# Patient Record
Sex: MALE | Race: WHITE | URBAN - METROPOLITAN AREA
[De-identification: names, ages, dates, MRNs, and addresses within clinical notes are randomized per-mention and may not be internally consistent; named-entity substitution may affect disease eponyms.]

---

## 2019-03-11 ENCOUNTER — OFFICE VISIT (OUTPATIENT)
Dept: INTERNAL MEDICINE CLINIC | Age: 59
End: 2019-03-11
Payer: COMMERCIAL

## 2019-03-11 VITALS
WEIGHT: 195 LBS | BODY MASS INDEX: 31.47 KG/M2 | OXYGEN SATURATION: 98 % | SYSTOLIC BLOOD PRESSURE: 138 MMHG | TEMPERATURE: 98 F | DIASTOLIC BLOOD PRESSURE: 90 MMHG | HEART RATE: 65 BPM

## 2019-03-11 DIAGNOSIS — R53.83 OTHER FATIGUE: ICD-10-CM

## 2019-03-11 DIAGNOSIS — I25.10 CORONARY ARTERY DISEASE INVOLVING NATIVE CORONARY ARTERY OF NATIVE HEART WITHOUT ANGINA PECTORIS: ICD-10-CM

## 2019-03-11 DIAGNOSIS — B34.9 VIRAL SYNDROME: Primary | ICD-10-CM

## 2019-03-11 PROCEDURE — 3017F COLORECTAL CA SCREEN DOC REV: CPT | Performed by: INTERNAL MEDICINE

## 2019-03-11 PROCEDURE — 1036F TOBACCO NON-USER: CPT | Performed by: INTERNAL MEDICINE

## 2019-03-11 PROCEDURE — G8427 DOCREV CUR MEDS BY ELIG CLIN: HCPCS | Performed by: INTERNAL MEDICINE

## 2019-03-11 PROCEDURE — 99214 OFFICE O/P EST MOD 30 MIN: CPT | Performed by: INTERNAL MEDICINE

## 2019-03-11 PROCEDURE — G8419 CALC BMI OUT NRM PARAM NOF/U: HCPCS | Performed by: INTERNAL MEDICINE

## 2019-03-11 PROCEDURE — G8598 ASA/ANTIPLAT THER USED: HCPCS | Performed by: INTERNAL MEDICINE

## 2019-03-11 PROCEDURE — G8484 FLU IMMUNIZE NO ADMIN: HCPCS | Performed by: INTERNAL MEDICINE

## 2019-03-11 RX ORDER — PANTOPRAZOLE SODIUM 20 MG/1
20 TABLET, DELAYED RELEASE ORAL DAILY
COMMUNITY

## 2019-03-11 RX ORDER — CLOPIDOGREL BISULFATE 75 MG/1
75 TABLET ORAL DAILY
COMMUNITY

## 2019-03-11 RX ORDER — AMLODIPINE BESYLATE 5 MG/1
5 TABLET ORAL DAILY
COMMUNITY

## 2019-03-11 RX ORDER — NITROGLYCERIN 0.4 MG/1
0.4 TABLET SUBLINGUAL EVERY 5 MIN PRN
COMMUNITY

## 2019-03-11 ASSESSMENT — ENCOUNTER SYMPTOMS
VOMITING: 1
APNEA: 0
STRIDOR: 0
CHOKING: 0
COUGH: 0
SINUS PRESSURE: 0
NAUSEA: 1
RESPIRATORY NEGATIVE: 1
COLOR CHANGE: 0
VISUAL CHANGE: 1
BACK PAIN: 0

## 2020-06-08 ENCOUNTER — VIRTUAL VISIT (OUTPATIENT)
Dept: FAMILY MEDICINE CLINIC | Age: 60
End: 2020-06-08

## 2020-06-08 PROCEDURE — 99213 OFFICE O/P EST LOW 20 MIN: CPT | Performed by: INTERNAL MEDICINE

## 2020-06-08 ASSESSMENT — PATIENT HEALTH QUESTIONNAIRE - PHQ9
1. LITTLE INTEREST OR PLEASURE IN DOING THINGS: 0
SUM OF ALL RESPONSES TO PHQ9 QUESTIONS 1 & 2: 0
2. FEELING DOWN, DEPRESSED OR HOPELESS: 0
SUM OF ALL RESPONSES TO PHQ QUESTIONS 1-9: 0
SUM OF ALL RESPONSES TO PHQ QUESTIONS 1-9: 0

## 2020-06-08 ASSESSMENT — ENCOUNTER SYMPTOMS
COLOR CHANGE: 0
SINUS PRESSURE: 0
BACK PAIN: 1
APNEA: 0
EYES NEGATIVE: 1
CHOKING: 0
STRIDOR: 0

## 2020-06-08 NOTE — PROGRESS NOTES
Smoker        Packs/day: 0.50        Years: 40.00        Pack years: 21        Quit date: 2019        Years since quittin.3      Smokeless tobacco: Never Used    Alcohol use: Yes      Alcohol/week: 0.0 standard drinks      Comment: shayna Roman is a 61 y.o. male evaluated via telephone on 2020. Consent:  He and/or health care decision maker is aware that that he may receive a bill for this telephone service, depending on his insurance coverage, and has provided verbal consent to proceed: Yes      Documentation:  I communicated with the patient and/or health care decision maker about leg pain. .   Details of this discussion including any medical advice provided: by me. I affirm this is a Patient Initiated Episode with a Patient who has not had a related appointment within my department in the past 7 days or scheduled within the next 24 hours. Patient identification was verified at the start of the visit: Yes    Total Time: minutes: 11-20 minutes    Note: not billable if this call serves to triage the patient into an appointment for the relevant concern      Helane Heal       Leg Pain    The incident occurred more than 1 week ago. The incident occurred at home. Injury mechanism: pulled lt leg. The pain is present in the left leg and left thigh. The pain is moderate. The pain has been constant since onset. Associated symptoms include an inability to bear weight. He reports no foreign bodies present. The symptoms are aggravated by weight bearing and movement. The treatment provided no relief. Review of Systems   Constitutional: Positive for fatigue. Negative for appetite change. HENT: Negative for ear pain, hearing loss and sinus pressure. Eyes: Negative. Respiratory: Negative for apnea, choking and stridor. Cardiovascular: Negative for chest pain and palpitations. Genitourinary: Negative for discharge, hematuria, penile pain and penile swelling.    Musculoskeletal:

## 2021-03-18 ENCOUNTER — IMMUNIZATION (OUTPATIENT)
Dept: PRIMARY CARE CLINIC | Age: 61
End: 2021-03-18
Payer: COMMERCIAL

## 2021-03-18 PROCEDURE — 91301 COVID-19, MODERNA VACCINE 100MCG/0.5ML DOSE: CPT

## 2021-03-18 PROCEDURE — 0011A COVID-19, MODERNA VACCINE 100MCG/0.5ML DOSE: CPT

## 2021-04-15 ENCOUNTER — IMMUNIZATION (OUTPATIENT)
Dept: PRIMARY CARE CLINIC | Age: 61
End: 2021-04-15
Payer: COMMERCIAL

## 2021-04-15 PROCEDURE — 0012A COVID-19, MODERNA VACCINE 100MCG/0.5ML DOSE: CPT | Performed by: FAMILY MEDICINE

## 2021-04-15 PROCEDURE — 91301 COVID-19, MODERNA VACCINE 100MCG/0.5ML DOSE: CPT | Performed by: FAMILY MEDICINE

## 2022-03-07 ENCOUNTER — NURSE TRIAGE (OUTPATIENT)
Dept: OTHER | Facility: CLINIC | Age: 62
End: 2022-03-07

## 2022-03-07 NOTE — TELEPHONE ENCOUNTER
Received call from [facility]; Patient with Red Flag Complaint requesting to establish care with [facility]. Subjective: Caller states \"I have had lower back pain for a long time. I notice when I sit longer than 15 minutes it locks up. I also have numbness/tingling of the R foot that would usually go away by now. The last couple weeks or month has been difficult for me to move without pain. \" Pt denies CP, SOB, dizziness, dysuria, or hematuria. PMH: multiple stents placed in heart (takes plavix)       Current Symptoms: back pain, R calf pain, R foot numbness    Onset: 2 weeks ago; intermittent    Associated Symptoms: reduced activity    Pain Severity: 5/10; sharp; constant    Temperature:  Denies    What has been tried: tylenol    LMP: NA Pregnant: NA    Recommended disposition: See in Office Today. Pt is un-established and recommended to be seen in [address] [place], [place] [address] for evaluation. Pt states \" I would rather just be seen in the office by the PCP. I have been dealing with this issue for several weeks. \"     Care advice provided, patient verbalizes understanding; denies any other questions or concerns; instructed to call back for any new or worsening symptoms. Writer provided a warm transfer to [facility] in [address] for appointment scheduling. Attention Provider: Thank you for allowing me to participate in the care of your patient. The patient was connected to triage in response to information provided to the [facility]. Please do not respond through this encounter as the response is not directed to a shared pool.         Reason for Disposition   Numbness in a leg or foot (i.e., loss of sensation)    Protocols used: BACK PAIN-ADULT-[place]

## 2022-03-16 ENCOUNTER — OFFICE VISIT (OUTPATIENT)
Dept: INTERNAL MEDICINE CLINIC | Age: 62
End: 2022-03-16
Payer: COMMERCIAL

## 2022-03-16 VITALS
DIASTOLIC BLOOD PRESSURE: 70 MMHG | WEIGHT: 209 LBS | HEIGHT: 66 IN | SYSTOLIC BLOOD PRESSURE: 134 MMHG | HEART RATE: 58 BPM | BODY MASS INDEX: 33.59 KG/M2

## 2022-03-16 DIAGNOSIS — M51.16 LUMBAR DISC DISEASE WITH RADICULOPATHY: ICD-10-CM

## 2022-03-16 DIAGNOSIS — Z87.891 PERSONAL HISTORY OF TOBACCO USE: ICD-10-CM

## 2022-03-16 DIAGNOSIS — Z12.5 PROSTATE CANCER SCREENING: ICD-10-CM

## 2022-03-16 DIAGNOSIS — I10 PRIMARY HYPERTENSION: ICD-10-CM

## 2022-03-16 DIAGNOSIS — Z00.00 ANNUAL PHYSICAL EXAM: Primary | ICD-10-CM

## 2022-03-16 PROBLEM — Z95.5 S/P CORONARY ARTERY STENT PLACEMENT: Status: ACTIVE | Noted: 2020-07-10

## 2022-03-16 PROBLEM — I25.118 CORONARY ARTERY DISEASE OF NATIVE ARTERY OF NATIVE HEART WITH STABLE ANGINA PECTORIS (HCC): Status: ACTIVE | Noted: 2021-05-25

## 2022-03-16 PROBLEM — T82.855A CORONARY STENT RESTENOSIS: Status: ACTIVE | Noted: 2020-12-22

## 2022-03-16 PROBLEM — I45.10 RIGHT BUNDLE BRANCH BLOCK: Status: ACTIVE | Noted: 2020-11-24

## 2022-03-16 PROCEDURE — G0296 VISIT TO DETERM LDCT ELIG: HCPCS | Performed by: INTERNAL MEDICINE

## 2022-03-16 PROCEDURE — G8484 FLU IMMUNIZE NO ADMIN: HCPCS | Performed by: INTERNAL MEDICINE

## 2022-03-16 PROCEDURE — G0402 INITIAL PREVENTIVE EXAM: HCPCS | Performed by: INTERNAL MEDICINE

## 2022-03-16 RX ORDER — GABAPENTIN 100 MG/1
100 CAPSULE ORAL 3 TIMES DAILY
COMMUNITY

## 2022-03-16 SDOH — ECONOMIC STABILITY: FOOD INSECURITY: WITHIN THE PAST 12 MONTHS, YOU WORRIED THAT YOUR FOOD WOULD RUN OUT BEFORE YOU GOT MONEY TO BUY MORE.: NEVER TRUE

## 2022-03-16 SDOH — ECONOMIC STABILITY: FOOD INSECURITY: WITHIN THE PAST 12 MONTHS, THE FOOD YOU BOUGHT JUST DIDN'T LAST AND YOU DIDN'T HAVE MONEY TO GET MORE.: NEVER TRUE

## 2022-03-16 ASSESSMENT — ENCOUNTER SYMPTOMS
NAUSEA: 0
ABDOMINAL PAIN: 0
VOMITING: 0
SHORTNESS OF BREATH: 0
CONSTIPATION: 0
COLOR CHANGE: 0
WHEEZING: 0
COUGH: 0
SORE THROAT: 0
BACK PAIN: 1
CHEST TIGHTNESS: 0

## 2022-03-16 ASSESSMENT — PATIENT HEALTH QUESTIONNAIRE - PHQ9
SUM OF ALL RESPONSES TO PHQ QUESTIONS 1-9: 0
SUM OF ALL RESPONSES TO PHQ QUESTIONS 1-9: 0
1. LITTLE INTEREST OR PLEASURE IN DOING THINGS: 0
2. FEELING DOWN, DEPRESSED OR HOPELESS: 0
SUM OF ALL RESPONSES TO PHQ QUESTIONS 1-9: 0
SUM OF ALL RESPONSES TO PHQ9 QUESTIONS 1 & 2: 0
SUM OF ALL RESPONSES TO PHQ QUESTIONS 1-9: 0

## 2022-03-16 ASSESSMENT — SOCIAL DETERMINANTS OF HEALTH (SDOH): HOW HARD IS IT FOR YOU TO PAY FOR THE VERY BASICS LIKE FOOD, HOUSING, MEDICAL CARE, AND HEATING?: NOT HARD AT ALL

## 2022-03-16 NOTE — ASSESSMENT & PLAN NOTE
Age-related health maintenance and immunization recommendations reviewed and discussed with patient  Labs ordered, healthy diet and regular exercise recommendations made to patient  He is up-to-date with colonoscopy  He does have BPH however symptoms manageable without medications used to be on Flomax, will check PSA this visit  Depression screen is negative  Follow-up with cardiology and spinal surgery as scheduled

## 2022-03-16 NOTE — ASSESSMENT & PLAN NOTE
So spinal surgery this morning, MRI ordered and given prescription to try gabapentin, advised will follow along with spinal surgery, recommended to maintain regular level of physical activity and attempt weight loss

## 2022-03-16 NOTE — PROGRESS NOTES
ASSESSMENT/PLAN:  1. Annual physical exam  Assessment & Plan:   Age-related health maintenance and immunization recommendations reviewed and discussed with patient  Labs ordered, healthy diet and regular exercise recommendations made to patient  He is up-to-date with colonoscopy  He does have BPH however symptoms manageable without medications used to be on Flomax, will check PSA this visit  Depression screen is negative  Follow-up with cardiology and spinal surgery as scheduled  Orders:  -     Hemoglobin A1C; Future  -     Lipid Panel; Future  -     CBC; Future  -     Comprehensive Metabolic Panel; Future  -     Hepatitis C Antibody; Future  -     TSH with Reflex to FT4; Future  -     PSA, Prostatic Specific Antigen; Future  2. Lumbar disc disease with radiculopathy  Assessment & Plan:   So spinal surgery this morning, MRI ordered and given prescription to try gabapentin, advised will follow along with spinal surgery, recommended to maintain regular level of physical activity and attempt weight loss  3. Primary hypertension  4. Prostate cancer screening  -     PSA, Prostatic Specific Antigen; Future  5. Personal history of tobacco use  -     DC VISIT TO DISCUSS LUNG CA SCREEN W LDCT  -     CT Lung Screen (Annual); Future      Return in about 6 months (around 9/16/2022) for AWV. SUBJECTIVE  HPI:   Patient here to establish new PCP office visit, previous PCP retired  He is a 80-year-old male with known history of coronary artery disease s/p CABG, follows up with cardiology regularly, he also has hypertension, hyperlipidemia and BPH. Was seen in the emergency room recently for acute calf pain that was negative for DVT, he also had low back pain with tingling and numbness radiating down the right leg.   Does have past history of lumbar disc disease and was referred to spinal surgery home he saw this morning  He is currently retired, he is on SSI due to his heart disease, he is an ex-smoker,  and lives with his wife      Review of Systems   Constitutional: Negative for activity change, appetite change, fatigue and unexpected weight change. HENT: Negative for congestion, hearing loss, mouth sores and sore throat. Eyes: Negative for visual disturbance. Respiratory: Negative for cough, chest tightness, shortness of breath and wheezing. Cardiovascular: Negative for chest pain, palpitations and leg swelling. Gastrointestinal: Negative for abdominal pain, constipation, nausea and vomiting. Endocrine: Negative for cold intolerance and heat intolerance. Genitourinary: Positive for frequency. Negative for difficulty urinating, dysuria, hematuria, scrotal swelling and urgency. Musculoskeletal: Positive for arthralgias and back pain. Negative for gait problem and joint swelling. Skin: Negative for color change. Allergic/Immunologic: Negative for environmental allergies and immunocompromised state. Neurological: Positive for numbness. Negative for dizziness, light-headedness and headaches. Hematological: Does not bruise/bleed easily. Psychiatric/Behavioral: Negative for behavioral problems, dysphoric mood and sleep disturbance. The patient is not nervous/anxious. OBJECTIVE:    /70   Pulse 58   Ht 5' 6\" (1.676 m)   Wt 209 lb (94.8 kg)   BMI 33.73 kg/m²    Physical Exam  Constitutional:       Appearance: Normal appearance. He is normal weight. HENT:      Head: Normocephalic. Right Ear: Tympanic membrane and ear canal normal.      Left Ear: Tympanic membrane and ear canal normal.      Nose: Nose normal.      Mouth/Throat:      Mouth: Mucous membranes are moist.      Pharynx: Oropharynx is clear. Eyes:      Extraocular Movements: Extraocular movements intact. Conjunctiva/sclera: Conjunctivae normal.      Pupils: Pupils are equal, round, and reactive to light. Cardiovascular:      Rate and Rhythm: Normal rate and regular rhythm. Pulses: Normal pulses.       Heart sounds: Normal heart sounds. No murmur heard. Pulmonary:      Effort: Pulmonary effort is normal. No respiratory distress. Breath sounds: Normal breath sounds. No wheezing. Abdominal:      General: Bowel sounds are normal.      Palpations: Abdomen is soft. There is no mass. Tenderness: There is no abdominal tenderness. Musculoskeletal:         General: Tenderness present. No swelling, deformity or signs of injury. Normal range of motion. Cervical back: Normal range of motion and neck supple. Right lower leg: No edema. Left lower leg: No edema. Skin:     General: Skin is warm and dry. Neurological:      General: No focal deficit present. Mental Status: He is alert and oriented to person, place, and time. Mental status is at baseline. Cranial Nerves: No cranial nerve deficit. Psychiatric:         Mood and Affect: Mood normal.         Behavior: Behavior normal.         Thought Content: Thought content normal.           Electronically signed by Jairo Rich MD on 3/16/2022 at 12:10 PM.    This dictation was generated by voice recognition computer software. Although all attempts are made to edit the dictation for accuracy, there may be errors in the transcription that are not intended. Low Dose CT (LDCT) Lung Screening criteria met:     Age 50-77(Medicare) or 50-80 (USPSTF)   Pack year smoking >20   Still smoking or less than 15 year since quit   No sign or symptoms of lung cancer   > 11 months since last LDCT     Risks and benefits of lung cancer screening with LDCT scans discussed:    Significance of positive screen - False-positive LDCT results often occur. 95% of all positive results do not lead to a diagnosis of cancer. Usually further imaging can resolve most false-positive results; however, some patients may require invasive procedures.     Over diagnosis risk - 10% to 12% of screen-detected lung cancer cases are over diagnosedthat is, the cancer would not have been detected in the patient's lifetime without the screening. Need for follow up screens annually to continue lung cancer screening effectiveness     Risks associated with radiation from annual LDCT- Radiation exposure is about the same as for a mammogram, which is about 1/3 of the annual background radiation exposure from everyday life. Starting screening at age 54 is not likely to increase cancer risk from radiation exposure. Patients with comorbidities resulting in life expectancy of < 10 years, or that would preclude treatment of an abnormality identified on CT, should not be screened due to lack of benefit.     To obtain maximal benefit from this screening, smoking cessation and long-term abstinence from smoking is critical

## 2022-03-21 DIAGNOSIS — Z12.5 PROSTATE CANCER SCREENING: ICD-10-CM

## 2022-03-21 DIAGNOSIS — Z00.00 ANNUAL PHYSICAL EXAM: ICD-10-CM

## 2022-03-21 LAB
A/G RATIO: 1.7 (ref 1.1–2.2)
ALBUMIN SERPL-MCNC: 4.2 G/DL (ref 3.4–5)
ALP BLD-CCNC: 153 U/L (ref 40–129)
ALT SERPL-CCNC: 36 U/L (ref 10–40)
ANION GAP SERPL CALCULATED.3IONS-SCNC: 17 MMOL/L (ref 3–16)
AST SERPL-CCNC: 25 U/L (ref 15–37)
BILIRUB SERPL-MCNC: 0.3 MG/DL (ref 0–1)
BUN BLDV-MCNC: 11 MG/DL (ref 7–20)
CALCIUM SERPL-MCNC: 9.4 MG/DL (ref 8.3–10.6)
CHLORIDE BLD-SCNC: 107 MMOL/L (ref 99–110)
CHOLESTEROL, TOTAL: 117 MG/DL (ref 0–199)
CO2: 20 MMOL/L (ref 21–32)
CREAT SERPL-MCNC: 1 MG/DL (ref 0.8–1.3)
GFR AFRICAN AMERICAN: >60
GFR NON-AFRICAN AMERICAN: >60
GLUCOSE BLD-MCNC: 112 MG/DL (ref 70–99)
HCT VFR BLD CALC: 41.1 % (ref 40.5–52.5)
HDLC SERPL-MCNC: 22 MG/DL (ref 40–60)
HEMOGLOBIN: 13.4 G/DL (ref 13.5–17.5)
LDL CHOLESTEROL CALCULATED: 62 MG/DL
MCH RBC QN AUTO: 25.3 PG (ref 26–34)
MCHC RBC AUTO-ENTMCNC: 32.5 G/DL (ref 31–36)
MCV RBC AUTO: 78 FL (ref 80–100)
PDW BLD-RTO: 16.1 % (ref 12.4–15.4)
PLATELET # BLD: 209 K/UL (ref 135–450)
PMV BLD AUTO: 8 FL (ref 5–10.5)
POTASSIUM SERPL-SCNC: 4.5 MMOL/L (ref 3.5–5.1)
PROSTATE SPECIFIC ANTIGEN: 1.44 NG/ML (ref 0–4)
RBC # BLD: 5.27 M/UL (ref 4.2–5.9)
SODIUM BLD-SCNC: 144 MMOL/L (ref 136–145)
TOTAL PROTEIN: 6.7 G/DL (ref 6.4–8.2)
TRIGL SERPL-MCNC: 163 MG/DL (ref 0–150)
TSH REFLEX FT4: 1.52 UIU/ML (ref 0.27–4.2)
VLDLC SERPL CALC-MCNC: 33 MG/DL
WBC # BLD: 5 K/UL (ref 4–11)

## 2022-03-22 LAB
ESTIMATED AVERAGE GLUCOSE: 122.6 MG/DL
HBA1C MFR BLD: 5.9 %
HEPATITIS C ANTIBODY INTERPRETATION: REACTIVE

## 2022-04-15 PROBLEM — Z00.00 ANNUAL PHYSICAL EXAM: Status: RESOLVED | Noted: 2022-03-16 | Resolved: 2022-04-15

## 2022-06-22 ENCOUNTER — TELEPHONE (OUTPATIENT)
Dept: INTERNAL MEDICINE CLINIC | Age: 62
End: 2022-06-22

## 2022-06-22 DIAGNOSIS — U07.1 COVID-19: Primary | ICD-10-CM

## 2022-06-22 DIAGNOSIS — I25.118 CORONARY ARTERY DISEASE OF NATIVE ARTERY OF NATIVE HEART WITH STABLE ANGINA PECTORIS (HCC): ICD-10-CM

## 2022-06-22 NOTE — TELEPHONE ENCOUNTER
Pt calling has tested positive for covid---had gone to see [facility] Sunday at nursing home---he is running 102 fever has chills and cough---would like to see about getting the Paxlovid---please call pt. Thanks.

## 2022-06-22 NOTE — TELEPHONE ENCOUNTER
Paxlovid called in for patient. You will need to self-quarantine for 5 days AND be fever free x 24 hours w/out use of Tylenol/Advil/Aleve and 3 days after your symptom improvement, whichever happens later AND  followed by 5 days of masking . Anyone that you live with should also self quarantine for 5 days followed by 5 days of masking as well. This means that you should not go to the grocery store (use the Click [employer] at [employer] or have friends or family shop for you and leave on your porch) or go to the pharmacy (if you must, use the drive through and do not go into the store). I do recommend purchasing a pulse oximeter. This can be purchased through [employer]  or at your local pharmacy. This will help to monitor your oxygen levels. Should your oxygen levels drop below 92% you would need to go to the emergency department. If you should develop severe shortness of breath chest pain, please go to the ED. For your symptoms, I recommend managing them as you would a common cold with nasal saline, Coricidin HB , and Tylenol as needed for fevers and discomfort. Offer VV with Dr. Yazmin Najera tomorrow.

## 2024-03-04 ASSESSMENT — PATIENT HEALTH QUESTIONNAIRE - PHQ9
SUM OF ALL RESPONSES TO PHQ QUESTIONS 1-9: 2
2. FEELING DOWN, DEPRESSED OR HOPELESS: SEVERAL DAYS
SUM OF ALL RESPONSES TO PHQ QUESTIONS 1-9: 2
1. LITTLE INTEREST OR PLEASURE IN DOING THINGS: 1
SUM OF ALL RESPONSES TO PHQ QUESTIONS 1-9: 2
2. FEELING DOWN, DEPRESSED OR HOPELESS: 1
SUM OF ALL RESPONSES TO PHQ QUESTIONS 1-9: 2
1. LITTLE INTEREST OR PLEASURE IN DOING THINGS: SEVERAL DAYS
SUM OF ALL RESPONSES TO PHQ9 QUESTIONS 1 & 2: 2

## 2024-03-06 ENCOUNTER — OFFICE VISIT (OUTPATIENT)
Dept: INTERNAL MEDICINE CLINIC | Age: 64
End: 2024-03-06
Payer: MEDICARE

## 2024-03-06 VITALS
WEIGHT: 198 LBS | SYSTOLIC BLOOD PRESSURE: 140 MMHG | OXYGEN SATURATION: 97 % | DIASTOLIC BLOOD PRESSURE: 78 MMHG | BODY MASS INDEX: 31.96 KG/M2 | HEART RATE: 67 BPM

## 2024-03-06 DIAGNOSIS — I10 PRIMARY HYPERTENSION: ICD-10-CM

## 2024-03-06 DIAGNOSIS — N40.1 BENIGN PROSTATIC HYPERPLASIA WITH NOCTURIA: ICD-10-CM

## 2024-03-06 DIAGNOSIS — E78.2 MIXED HYPERLIPIDEMIA: ICD-10-CM

## 2024-03-06 DIAGNOSIS — R35.1 BENIGN PROSTATIC HYPERPLASIA WITH NOCTURIA: ICD-10-CM

## 2024-03-06 DIAGNOSIS — R73.01 IMPAIRED FASTING BLOOD SUGAR: ICD-10-CM

## 2024-03-06 DIAGNOSIS — R20.2 ARM PARESTHESIA, RIGHT: ICD-10-CM

## 2024-03-06 DIAGNOSIS — Z87.891 PERSONAL HISTORY OF TOBACCO USE: ICD-10-CM

## 2024-03-06 DIAGNOSIS — I25.118 CORONARY ARTERY DISEASE OF NATIVE ARTERY OF NATIVE HEART WITH STABLE ANGINA PECTORIS (HCC): ICD-10-CM

## 2024-03-06 DIAGNOSIS — R20.2 ARM PARESTHESIA, RIGHT: Primary | ICD-10-CM

## 2024-03-06 LAB
ALBUMIN SERPL-MCNC: 4.4 G/DL (ref 3.4–5)
ALBUMIN/GLOB SERPL: 1.6 {RATIO} (ref 1.1–2.2)
ALP SERPL-CCNC: 162 U/L (ref 40–129)
ALT SERPL-CCNC: 39 U/L (ref 10–40)
ANION GAP SERPL CALCULATED.3IONS-SCNC: 11 MMOL/L (ref 3–16)
AST SERPL-CCNC: 32 U/L (ref 15–37)
BACTERIA URNS QL MICRO: NORMAL /HPF
BILIRUB SERPL-MCNC: 0.4 MG/DL (ref 0–1)
BILIRUB UR QL STRIP.AUTO: NEGATIVE
BUN SERPL-MCNC: 12 MG/DL (ref 7–20)
CALCIUM SERPL-MCNC: 9.5 MG/DL (ref 8.3–10.6)
CHLORIDE SERPL-SCNC: 103 MMOL/L (ref 99–110)
CHOLEST SERPL-MCNC: 114 MG/DL (ref 0–199)
CLARITY UR: CLEAR
CO2 SERPL-SCNC: 26 MMOL/L (ref 21–32)
COLOR UR: ABNORMAL
CREAT SERPL-MCNC: 1.1 MG/DL (ref 0.8–1.3)
DEPRECATED RDW RBC AUTO: 17 % (ref 12.4–15.4)
EPI CELLS #/AREA URNS AUTO: 1 /HPF (ref 0–5)
GFR SERPLBLD CREATININE-BSD FMLA CKD-EPI: >60 ML/MIN/{1.73_M2}
GLUCOSE SERPL-MCNC: 109 MG/DL (ref 70–99)
GLUCOSE UR STRIP.AUTO-MCNC: NEGATIVE MG/DL
HCT VFR BLD AUTO: 42.8 % (ref 40.5–52.5)
HDLC SERPL-MCNC: 20 MG/DL (ref 40–60)
HGB BLD-MCNC: 13.9 G/DL (ref 13.5–17.5)
HGB UR QL STRIP.AUTO: NEGATIVE
HYALINE CASTS #/AREA URNS AUTO: 0 /LPF (ref 0–8)
KETONES UR STRIP.AUTO-MCNC: ABNORMAL MG/DL
LDLC SERPL CALC-MCNC: 73 MG/DL
LEUKOCYTE ESTERASE UR QL STRIP.AUTO: NEGATIVE
MCH RBC QN AUTO: 24.8 PG (ref 26–34)
MCHC RBC AUTO-ENTMCNC: 32.5 G/DL (ref 31–36)
MCV RBC AUTO: 76.4 FL (ref 80–100)
NITRITE UR QL STRIP.AUTO: NEGATIVE
PH UR STRIP.AUTO: 5.5 [PH] (ref 5–8)
PLATELET # BLD AUTO: 194 K/UL (ref 135–450)
PMV BLD AUTO: 8.8 FL (ref 5–10.5)
POTASSIUM SERPL-SCNC: 4.7 MMOL/L (ref 3.5–5.1)
PROT SERPL-MCNC: 7.2 G/DL (ref 6.4–8.2)
PROT UR STRIP.AUTO-MCNC: ABNORMAL MG/DL
PSA SERPL DL<=0.01 NG/ML-MCNC: 1.92 NG/ML (ref 0–4)
RBC # BLD AUTO: 5.6 M/UL (ref 4.2–5.9)
RBC CLUMPS #/AREA URNS AUTO: 2 /HPF (ref 0–4)
SODIUM SERPL-SCNC: 140 MMOL/L (ref 136–145)
SP GR UR STRIP.AUTO: 1.04 (ref 1–1.03)
TRIGL SERPL-MCNC: 105 MG/DL (ref 0–150)
TSH SERPL DL<=0.005 MIU/L-ACNC: 2.13 UIU/ML (ref 0.27–4.2)
UA COMPLETE W REFLEX CULTURE PNL UR: ABNORMAL
UA DIPSTICK W REFLEX MICRO PNL UR: YES
URN SPEC COLLECT METH UR: ABNORMAL
UROBILINOGEN UR STRIP-ACNC: 1 E.U./DL
VIT B12 SERPL-MCNC: 266 PG/ML (ref 211–911)
VLDLC SERPL CALC-MCNC: 21 MG/DL
WBC # BLD AUTO: 5.4 K/UL (ref 4–11)
WBC #/AREA URNS AUTO: 1 /HPF (ref 0–5)

## 2024-03-06 PROCEDURE — G8427 DOCREV CUR MEDS BY ELIG CLIN: HCPCS | Performed by: INTERNAL MEDICINE

## 2024-03-06 PROCEDURE — 3017F COLORECTAL CA SCREEN DOC REV: CPT | Performed by: INTERNAL MEDICINE

## 2024-03-06 PROCEDURE — 99214 OFFICE O/P EST MOD 30 MIN: CPT | Performed by: INTERNAL MEDICINE

## 2024-03-06 PROCEDURE — G8484 FLU IMMUNIZE NO ADMIN: HCPCS | Performed by: INTERNAL MEDICINE

## 2024-03-06 PROCEDURE — 3078F DIAST BP <80 MM HG: CPT | Performed by: INTERNAL MEDICINE

## 2024-03-06 PROCEDURE — G0296 VISIT TO DETERM LDCT ELIG: HCPCS | Performed by: INTERNAL MEDICINE

## 2024-03-06 PROCEDURE — 1036F TOBACCO NON-USER: CPT | Performed by: INTERNAL MEDICINE

## 2024-03-06 PROCEDURE — 3077F SYST BP >= 140 MM HG: CPT | Performed by: INTERNAL MEDICINE

## 2024-03-06 PROCEDURE — G8417 CALC BMI ABV UP PARAM F/U: HCPCS | Performed by: INTERNAL MEDICINE

## 2024-03-06 RX ORDER — ATORVASTATIN CALCIUM 80 MG/1
80 TABLET, FILM COATED ORAL NIGHTLY
COMMUNITY
Start: 2024-01-19

## 2024-03-06 RX ORDER — TAMSULOSIN HYDROCHLORIDE 0.4 MG/1
0.4 CAPSULE ORAL DAILY
Qty: 90 CAPSULE | Refills: 0 | Status: SHIPPED | OUTPATIENT
Start: 2024-03-06

## 2024-03-06 SDOH — ECONOMIC STABILITY: INCOME INSECURITY: HOW HARD IS IT FOR YOU TO PAY FOR THE VERY BASICS LIKE FOOD, HOUSING, MEDICAL CARE, AND HEATING?: NOT HARD AT ALL

## 2024-03-06 SDOH — ECONOMIC STABILITY: HOUSING INSECURITY
IN THE LAST 12 MONTHS, WAS THERE A TIME WHEN YOU DID NOT HAVE A STEADY PLACE TO SLEEP OR SLEPT IN A SHELTER (INCLUDING NOW)?: NO

## 2024-03-06 SDOH — ECONOMIC STABILITY: FOOD INSECURITY: WITHIN THE PAST 12 MONTHS, YOU WORRIED THAT YOUR FOOD WOULD RUN OUT BEFORE YOU GOT MONEY TO BUY MORE.: NEVER TRUE

## 2024-03-06 SDOH — ECONOMIC STABILITY: FOOD INSECURITY: WITHIN THE PAST 12 MONTHS, THE FOOD YOU BOUGHT JUST DIDN'T LAST AND YOU DIDN'T HAVE MONEY TO GET MORE.: NEVER TRUE

## 2024-03-06 ASSESSMENT — ENCOUNTER SYMPTOMS
COLOR CHANGE: 0
NAUSEA: 0
VOMITING: 0
BACK PAIN: 1
CONSTIPATION: 0
ABDOMINAL PAIN: 0
CHEST TIGHTNESS: 0
WHEEZING: 0
SHORTNESS OF BREATH: 0
SORE THROAT: 0
COUGH: 0

## 2024-03-06 NOTE — ASSESSMENT & PLAN NOTE
will update labs, reinforced recommendations to maintain healthy diet and active lifestyle, continue atorvastatin 80 mg, so far tolerating the dose well without any side effects

## 2024-03-06 NOTE — ASSESSMENT & PLAN NOTE
patient was on Flomax in the past with good relief of symptoms however he discontinued it due to to side effects which are unlikely secondary or related to Flomax, advised patient since this was years ago we will go ahead and do another trial and if same symptoms recur then we will discuss alternatives or referral to urology.  Will check PSA level as well

## 2024-03-06 NOTE — ASSESSMENT & PLAN NOTE
continues to be stable on current medication regimen, this has been recently modified by cardiology, he is currently on amlodipine twice daily, Plavix was discontinued and atorvastatin dose increased to 80 mg from 40.  Continue care as per cardiology recommendations at [facility]

## 2024-03-06 NOTE — PROGRESS NOTES
ASSESSMENT/PLAN:  1. Arm paresthesia, right  Assessment & Plan:    patient had recent CT scan of the head at the ER visit that was negative, he is denying any neck or shoulder pain, explained to patient symptoms can be secondary to peripheral neuropathy, advised will check EMG, check B12, folate and TSH levels and make recommendations pending results, he does have gabapentin at home which was given to him previously for low back pain and he is no longer using it, advised can try and restart to see if this will help with his arm paresthesia symptoms until results of EMG are back  Orders:  -     CBC; Future  -     Comprehensive Metabolic Panel; Future  -     Hemoglobin A1C; Future  -     TSH with Reflex to FT4; Future  -     Vitamin B12; Future  -     Alfonso Garsia MD (Inpatient and Outpatient EMG), [facility]  2. Benign prostatic hyperplasia with nocturia  Assessment & Plan:    patient was on Flomax in the past with good relief of symptoms however he discontinued it due to to side effects which are unlikely secondary or related to Flomax, advised patient since this was years ago we will go ahead and do another trial and if same symptoms recur then we will discuss alternatives or referral to urology.  Will check PSA level as well  Orders:  -     Urinalysis with Reflex to Culture; Future  -     tamsulosin (FLOMAX) 0.4 MG capsule; Take 1 capsule by mouth daily, Disp-90 capsule, R-0Normal  -     PSA, Prostatic Specific Antigen; Future  3. Coronary artery disease of native artery of native heart with stable angina pectoris (HCC)  Assessment & Plan:    continues to be stable on current medication regimen, this has been recently modified by cardiology, he is currently on amlodipine twice daily, Plavix was discontinued and atorvastatin dose increased to 80 mg from 40.  Continue care as per cardiology recommendations at [facility]  Orders:  -     CBC; Future  -     Comprehensive Metabolic Panel; Future  -

## 2024-03-06 NOTE — ASSESSMENT & PLAN NOTE
initial reading borderline elevated however recheck improved, reinforced recommendations for salt restriction, weight reduction, healthy diet and active lifestyle as other means to help control blood pressure, will update labs this visit, continue amlodipine and metoprolol

## 2024-03-06 NOTE — PATIENT INSTRUCTIONS
follow-up, he or she will help you understand what to do next.  After a lung cancer screening, you can go back to your usual activities right away.  A lung cancer screening test can't tell if you have lung cancer. If your results are positive, your doctor can't tell whether an abnormal finding is a harmless nodule, cancer, or something else without doing more tests.  What can you do to help prevent lung cancer?  Some lung cancers can't be prevented. But if you smoke, quitting smoking is the best step you can take to prevent lung cancer. If you want to quit, your doctor can recommend medicines or other ways to help.  Follow-up care is a key part of your treatment and safety. Be sure to make and go to all appointments, and call your doctor if you are having problems. It's also a good idea to know your test results and keep a list of the medicines you take.  Where can you learn more?  Go to https://www.[employer].net/patientEd and enter Q940 to learn more about \"Learning About Lung Cancer Screening.\"  Current as of: February 28, 2023               Content Version: 13.9  © 7896-2385 [employer].   Care instructions adapted under license by [employer]. If you have questions about a medical condition or this instruction, always ask your healthcare professional. [employer] disclaims any warranty or liability for your use of this information.

## 2024-03-06 NOTE — ASSESSMENT & PLAN NOTE
patient had recent CT scan of the head at the ER visit that was negative, he is denying any neck or shoulder pain, explained to patient symptoms can be secondary to peripheral neuropathy, advised will check EMG, check B12, folate and TSH levels and make recommendations pending results, he does have gabapentin at home which was given to him previously for low back pain and he is no longer using it, advised can try and restart to see if this will help with his arm paresthesia symptoms until results of EMG are back

## 2024-03-07 LAB
EST. AVERAGE GLUCOSE BLD GHB EST-MCNC: 116.9 MG/DL
HBA1C MFR BLD: 5.7 %

## 2024-05-27 DIAGNOSIS — R35.1 BENIGN PROSTATIC HYPERPLASIA WITH NOCTURIA: ICD-10-CM

## 2024-05-27 DIAGNOSIS — N40.1 BENIGN PROSTATIC HYPERPLASIA WITH NOCTURIA: ICD-10-CM

## 2024-05-28 DIAGNOSIS — R35.1 BENIGN PROSTATIC HYPERPLASIA WITH NOCTURIA: ICD-10-CM

## 2024-05-28 DIAGNOSIS — N40.1 BENIGN PROSTATIC HYPERPLASIA WITH NOCTURIA: ICD-10-CM

## 2024-05-28 RX ORDER — TAMSULOSIN HYDROCHLORIDE 0.4 MG/1
0.4 CAPSULE ORAL DAILY
Qty: 90 CAPSULE | OUTPATIENT
Start: 2024-05-28

## 2024-05-28 RX ORDER — TAMSULOSIN HYDROCHLORIDE 0.4 MG/1
0.4 CAPSULE ORAL DAILY
Qty: 30 CAPSULE | Refills: 0 | Status: SHIPPED | OUTPATIENT
Start: 2024-05-28

## 2024-05-28 NOTE — TELEPHONE ENCOUNTER
Please remind patient he is due for his 3 months follow-up and completing wellness visit.  I will approve 30 days but will need to make sure his follow-up visit is completed in the near future before able to order long-term refills

## 2024-05-28 NOTE — TELEPHONE ENCOUNTER
Last OV: 3/6/2024  Next OV: Visit date not found    Next appointment due: 06/06/2024    Last fill: 03/06/2024  Refills: 0

## 2024-06-26 DIAGNOSIS — R35.1 BENIGN PROSTATIC HYPERPLASIA WITH NOCTURIA: ICD-10-CM

## 2024-06-26 DIAGNOSIS — N40.1 BENIGN PROSTATIC HYPERPLASIA WITH NOCTURIA: ICD-10-CM

## 2024-06-26 RX ORDER — TAMSULOSIN HYDROCHLORIDE 0.4 MG/1
0.4 CAPSULE ORAL DAILY
Qty: 90 CAPSULE | Refills: 0 | Status: SHIPPED | OUTPATIENT
Start: 2024-06-26

## 2024-07-22 ENCOUNTER — TELEPHONE (OUTPATIENT)
Dept: INTERNAL MEDICINE CLINIC | Age: 64
End: 2024-07-22

## 2024-08-16 ENCOUNTER — TELEPHONE (OUTPATIENT)
Dept: INTERNAL MEDICINE CLINIC | Age: 64
End: 2024-08-16

## 2024-08-16 SDOH — HEALTH STABILITY: PHYSICAL HEALTH: ON AVERAGE, HOW MANY MINUTES DO YOU ENGAGE IN EXERCISE AT THIS LEVEL?: 60 MIN

## 2024-08-16 SDOH — HEALTH STABILITY: PHYSICAL HEALTH: ON AVERAGE, HOW MANY DAYS PER WEEK DO YOU ENGAGE IN MODERATE TO STRENUOUS EXERCISE (LIKE A BRISK WALK)?: 3 DAYS

## 2024-08-16 ASSESSMENT — PATIENT HEALTH QUESTIONNAIRE - PHQ9
SUM OF ALL RESPONSES TO PHQ9 QUESTIONS 1 & 2: 2
SUM OF ALL RESPONSES TO PHQ QUESTIONS 1-9: 2
1. LITTLE INTEREST OR PLEASURE IN DOING THINGS: SEVERAL DAYS
SUM OF ALL RESPONSES TO PHQ QUESTIONS 1-9: 2
2. FEELING DOWN, DEPRESSED OR HOPELESS: SEVERAL DAYS

## 2024-08-16 ASSESSMENT — LIFESTYLE VARIABLES
HOW MANY STANDARD DRINKS CONTAINING ALCOHOL DO YOU HAVE ON A TYPICAL DAY: 1
HOW OFTEN DO YOU HAVE SIX OR MORE DRINKS ON ONE OCCASION: 1
HOW MANY STANDARD DRINKS CONTAINING ALCOHOL DO YOU HAVE ON A TYPICAL DAY: 1 OR 2
HOW OFTEN DO YOU HAVE A DRINK CONTAINING ALCOHOL: 3
HOW OFTEN DO YOU HAVE A DRINK CONTAINING ALCOHOL: 2-4 TIMES A MONTH

## 2024-08-16 NOTE — TELEPHONE ENCOUNTER
Patient called from nurse triage line--states here lately he has been having issues with low blood pressure the past week.       Schedule patient for appointment on Monday

## 2024-08-19 ENCOUNTER — OFFICE VISIT (OUTPATIENT)
Dept: INTERNAL MEDICINE CLINIC | Age: 64
End: 2024-08-19
Payer: MEDICARE

## 2024-08-19 VITALS
SYSTOLIC BLOOD PRESSURE: 138 MMHG | BODY MASS INDEX: 30.18 KG/M2 | DIASTOLIC BLOOD PRESSURE: 80 MMHG | WEIGHT: 187 LBS | HEART RATE: 54 BPM | OXYGEN SATURATION: 99 %

## 2024-08-19 DIAGNOSIS — Z00.00 MEDICARE ANNUAL WELLNESS VISIT, SUBSEQUENT: Primary | ICD-10-CM

## 2024-08-19 DIAGNOSIS — M25.541 ARTHRALGIA OF BOTH HANDS: ICD-10-CM

## 2024-08-19 DIAGNOSIS — M51.16 LUMBAR DISC DISEASE WITH RADICULOPATHY: Primary | ICD-10-CM

## 2024-08-19 DIAGNOSIS — M25.542 ARTHRALGIA OF BOTH HANDS: ICD-10-CM

## 2024-08-19 DIAGNOSIS — M51.16 LUMBAR DISC DISEASE WITH RADICULOPATHY: ICD-10-CM

## 2024-08-19 DIAGNOSIS — N40.1 BENIGN PROSTATIC HYPERPLASIA WITH NOCTURIA: ICD-10-CM

## 2024-08-19 DIAGNOSIS — I25.118 CORONARY ARTERY DISEASE OF NATIVE ARTERY OF NATIVE HEART WITH STABLE ANGINA PECTORIS (HCC): ICD-10-CM

## 2024-08-19 DIAGNOSIS — R35.1 BENIGN PROSTATIC HYPERPLASIA WITH NOCTURIA: ICD-10-CM

## 2024-08-19 DIAGNOSIS — I10 PRIMARY HYPERTENSION: ICD-10-CM

## 2024-08-19 PROCEDURE — 1036F TOBACCO NON-USER: CPT | Performed by: INTERNAL MEDICINE

## 2024-08-19 PROCEDURE — 3079F DIAST BP 80-89 MM HG: CPT | Performed by: INTERNAL MEDICINE

## 2024-08-19 PROCEDURE — 99214 OFFICE O/P EST MOD 30 MIN: CPT | Performed by: INTERNAL MEDICINE

## 2024-08-19 PROCEDURE — 3075F SYST BP GE 130 - 139MM HG: CPT | Performed by: INTERNAL MEDICINE

## 2024-08-19 PROCEDURE — G8427 DOCREV CUR MEDS BY ELIG CLIN: HCPCS | Performed by: INTERNAL MEDICINE

## 2024-08-19 PROCEDURE — G0439 PPPS, SUBSEQ VISIT: HCPCS | Performed by: INTERNAL MEDICINE

## 2024-08-19 PROCEDURE — 3017F COLORECTAL CA SCREEN DOC REV: CPT | Performed by: INTERNAL MEDICINE

## 2024-08-19 PROCEDURE — G8417 CALC BMI ABV UP PARAM F/U: HCPCS | Performed by: INTERNAL MEDICINE

## 2024-08-19 ASSESSMENT — ENCOUNTER SYMPTOMS
WHEEZING: 0
VOMITING: 0
ABDOMINAL PAIN: 0
SHORTNESS OF BREATH: 0
COUGH: 0
COLOR CHANGE: 0
BACK PAIN: 1
CHEST TIGHTNESS: 0
SORE THROAT: 0
CONSTIPATION: 0
NAUSEA: 0

## 2024-08-19 NOTE — ASSESSMENT & PLAN NOTE
patient was previously seen by pain management however was discharged because he continued to use marijuana Gummies at the time, now reports he no longer uses that and it has not been helping his pain so he stopped, he wants to give it another shot with pain management, new referral placed

## 2024-08-19 NOTE — ASSESSMENT & PLAN NOTE
no evidence of acute inflammation on exam, suspect symptoms secondary to osteoarthritis, can continue daily Tylenol alternating with NSAIDs until able to see pain management

## 2024-08-19 NOTE — PROGRESS NOTES
provided to the patient in written form: see Patient Instructions/AVS.    Electronically signed by Oniel Foster MD on 8/19/2024 at 12:33 PM.

## 2024-08-19 NOTE — ASSESSMENT & PLAN NOTE
has been mostly stable, recent episodes with orthostatic hypotension appears to be side effects of medications however if that is not the case then he will move his follow-up appointment with cardiology which is not till the end of the year.

## 2024-08-19 NOTE — ASSESSMENT & PLAN NOTE
blood pressure initial reading borderline however recheck within acceptable range, continue current medication combination of amlodipine 5 mg once a day and metoprolol 50.  If readings starts fluctuating up again then we will go back to amlodipine 5 mg twice daily as suspect orthostatic hypotension is secondary to Flomax rather than his beta-blocker or calcium channel blocker therapy

## 2024-08-19 NOTE — ASSESSMENT & PLAN NOTE
Patient will attempt and discontinue Flomax to see if this will help with his postural hypotension specially that he continues to have nocturia of average 3 times per night despite taking Flomax.  If orthostatic dizziness subsides with discontinuing Flomax will probably need to discuss alternative therapy

## 2024-09-25 ENCOUNTER — HOSPITAL ENCOUNTER (OUTPATIENT)
Age: 64
Discharge: HOME | End: 2024-09-25
Payer: MEDICARE

## 2024-09-25 ENCOUNTER — OFFICE VISIT (OUTPATIENT)
Dept: PAIN MANAGEMENT | Age: 64
End: 2024-09-25
Payer: MEDICARE

## 2024-09-25 ENCOUNTER — HOSPITAL ENCOUNTER (OUTPATIENT)
Dept: GENERAL RADIOLOGY | Age: 64
Discharge: HOME | End: 2024-09-25
Payer: MEDICARE

## 2024-09-25 VITALS
OXYGEN SATURATION: 98 % | DIASTOLIC BLOOD PRESSURE: 77 MMHG | HEART RATE: 56 BPM | WEIGHT: 190 LBS | BODY MASS INDEX: 30.53 KG/M2 | HEIGHT: 66 IN | SYSTOLIC BLOOD PRESSURE: 154 MMHG

## 2024-09-25 DIAGNOSIS — M19.041 LOCALIZED OSTEOARTHRITIS OF HANDS, BILATERAL: ICD-10-CM

## 2024-09-25 DIAGNOSIS — M19.042 LOCALIZED OSTEOARTHRITIS OF HANDS, BILATERAL: ICD-10-CM

## 2024-09-25 DIAGNOSIS — G89.4 CHRONIC PAIN SYNDROME: ICD-10-CM

## 2024-09-25 DIAGNOSIS — M19.042 LOCALIZED OSTEOARTHRITIS OF HANDS, BILATERAL: Primary | ICD-10-CM

## 2024-09-25 DIAGNOSIS — M19.041 LOCALIZED OSTEOARTHRITIS OF HANDS, BILATERAL: Primary | ICD-10-CM

## 2024-09-25 DIAGNOSIS — Z51.81 ENCOUNTER FOR THERAPEUTIC DRUG MONITORING: ICD-10-CM

## 2024-09-25 LAB
CRP SERPL-MCNC: <3 MG/L (ref 0–5.1)
RHEUMATOID FACT SER IA-ACNC: <10 IU/ML

## 2024-09-25 PROCEDURE — 99204 OFFICE O/P NEW MOD 45 MIN: CPT | Performed by: NURSE PRACTITIONER

## 2024-09-25 PROCEDURE — 73130 X-RAY EXAM OF HAND: CPT

## 2024-09-25 PROCEDURE — 1036F TOBACCO NON-USER: CPT | Performed by: NURSE PRACTITIONER

## 2024-09-25 PROCEDURE — G8427 DOCREV CUR MEDS BY ELIG CLIN: HCPCS | Performed by: NURSE PRACTITIONER

## 2024-09-25 PROCEDURE — 3017F COLORECTAL CA SCREEN DOC REV: CPT | Performed by: NURSE PRACTITIONER

## 2024-09-25 PROCEDURE — G8417 CALC BMI ABV UP PARAM F/U: HCPCS | Performed by: NURSE PRACTITIONER

## 2024-09-25 PROCEDURE — 3077F SYST BP >= 140 MM HG: CPT | Performed by: NURSE PRACTITIONER

## 2024-09-25 PROCEDURE — 3078F DIAST BP <80 MM HG: CPT | Performed by: NURSE PRACTITIONER

## 2024-09-25 RX ORDER — HYDROCODONE BITARTRATE AND ACETAMINOPHEN 5; 325 MG/1; MG/1
1 TABLET ORAL EVERY 8 HOURS PRN
Qty: 63 TABLET | Refills: 0 | Status: SHIPPED | OUTPATIENT
Start: 2024-10-02 | End: 2024-10-23

## 2024-09-25 RX ORDER — HYDROCODONE BITARTRATE AND ACETAMINOPHEN 5; 325 MG/1; MG/1
1 TABLET ORAL EVERY 8 HOURS PRN
Qty: 21 TABLET | Refills: 0 | Status: SHIPPED | OUTPATIENT
Start: 2024-09-25 | End: 2024-10-02

## 2024-09-25 NOTE — PROGRESS NOTES
HISTORY OF PRESENT ILLNESS:  Mr. Baker is a 64 y.o. male presents for consultation at the request of Dr. Foster. His presenting problems are bilateral hand pain, low back pain. He has also been evaluated by PCP, cardiology. Onset of pain began about 2 years ago.   He rates the pain 10/10 and describes it as dull, aching. Pain is greater in his bilateral hands than his low back.  Pain is made worse by: getting up in the am, getting in/out chair. Activities that have been limited by pain that he otherwise tolerated well are sleeping.  Alternative therapies he has previously attempted are oral steroids, OTC medications. Current treatment regimen has helped relieve about 20% of the pain. Relieving factors of pain include nothing. Hedenies side effects from the current pain regimen. Patient reports mood is well and sleep patterns are Very poor. Patient deniesneurological bowel or bladder. Patient denies misusing/abusing his narcotic pain medications or using any illegal drugs. he admits to morning stiffness, fatigue, and occassional headaches.     Patient with history of 6 by cardiac bypass at age 42.  He reports by age 52 he had cardiac cath 10 times.  He does experience some right foot numbness that is chronic.  He is no longer on anticoagulants.  Worst pain for him is in bilateral hands and he reports it significantly affecting his sleep.  He reports nothing has helped his pain thus far.      ROS:  Review of Systems   Constitutional:  Positive for fatigue. Negative for unexpected weight change.   HENT:  Negative for congestion and dental problem.    Respiratory:  Negative for chest tightness and shortness of breath.    Cardiovascular:  Negative for chest pain, palpitations and leg swelling.   Gastrointestinal:  Negative for constipation.   Endocrine: Negative for polydipsia, polyphagia and polyuria.   Musculoskeletal:  Positive for arthralgias and back pain.        Bilateral hand pain   Skin:  Negative for rash.

## 2024-09-26 LAB — ANA SER QL IA: NEGATIVE

## 2024-10-08 PROBLEM — G89.4 CHRONIC PAIN SYNDROME: Status: ACTIVE | Noted: 2024-10-08

## 2024-10-08 PROBLEM — M19.041 LOCALIZED OSTEOARTHRITIS OF HANDS, BILATERAL: Status: ACTIVE | Noted: 2024-10-08

## 2024-10-08 PROBLEM — Z51.81 ENCOUNTER FOR THERAPEUTIC DRUG MONITORING: Status: ACTIVE | Noted: 2024-10-08

## 2024-10-08 PROBLEM — M19.042 LOCALIZED OSTEOARTHRITIS OF HANDS, BILATERAL: Status: ACTIVE | Noted: 2024-10-08

## 2024-10-08 ASSESSMENT — ENCOUNTER SYMPTOMS
CONSTIPATION: 0
SHORTNESS OF BREATH: 0
CHEST TIGHTNESS: 0
BACK PAIN: 1

## 2024-10-23 ENCOUNTER — OFFICE VISIT (OUTPATIENT)
Dept: PAIN MANAGEMENT | Age: 64
End: 2024-10-23
Payer: MEDICARE

## 2024-10-23 VITALS
HEART RATE: 57 BPM | DIASTOLIC BLOOD PRESSURE: 73 MMHG | OXYGEN SATURATION: 97 % | BODY MASS INDEX: 30.34 KG/M2 | SYSTOLIC BLOOD PRESSURE: 145 MMHG | WEIGHT: 188 LBS

## 2024-10-23 DIAGNOSIS — M19.041 LOCALIZED OSTEOARTHRITIS OF HANDS, BILATERAL: Primary | ICD-10-CM

## 2024-10-23 DIAGNOSIS — G89.4 CHRONIC PAIN SYNDROME: ICD-10-CM

## 2024-10-23 DIAGNOSIS — M19.042 LOCALIZED OSTEOARTHRITIS OF HANDS, BILATERAL: Primary | ICD-10-CM

## 2024-10-23 DIAGNOSIS — M79.2 NEUROPATHIC PAIN: ICD-10-CM

## 2024-10-23 DIAGNOSIS — Z51.81 ENCOUNTER FOR THERAPEUTIC DRUG MONITORING: ICD-10-CM

## 2024-10-23 PROCEDURE — 3017F COLORECTAL CA SCREEN DOC REV: CPT | Performed by: NURSE PRACTITIONER

## 2024-10-23 PROCEDURE — G8484 FLU IMMUNIZE NO ADMIN: HCPCS | Performed by: NURSE PRACTITIONER

## 2024-10-23 PROCEDURE — 99214 OFFICE O/P EST MOD 30 MIN: CPT | Performed by: NURSE PRACTITIONER

## 2024-10-23 PROCEDURE — 3077F SYST BP >= 140 MM HG: CPT | Performed by: NURSE PRACTITIONER

## 2024-10-23 PROCEDURE — 3078F DIAST BP <80 MM HG: CPT | Performed by: NURSE PRACTITIONER

## 2024-10-23 PROCEDURE — G8427 DOCREV CUR MEDS BY ELIG CLIN: HCPCS | Performed by: NURSE PRACTITIONER

## 2024-10-23 PROCEDURE — 1036F TOBACCO NON-USER: CPT | Performed by: NURSE PRACTITIONER

## 2024-10-23 PROCEDURE — G8417 CALC BMI ABV UP PARAM F/U: HCPCS | Performed by: NURSE PRACTITIONER

## 2024-10-23 RX ORDER — GABAPENTIN 300 MG/1
300 CAPSULE ORAL NIGHTLY PRN
Qty: 30 CAPSULE | Refills: 0 | Status: SHIPPED | OUTPATIENT
Start: 2024-10-23 | End: 2024-11-22

## 2024-10-23 RX ORDER — OXYCODONE AND ACETAMINOPHEN 5; 325 MG/1; MG/1
1 TABLET ORAL 2 TIMES DAILY PRN
Qty: 56 TABLET | Refills: 0 | Status: SHIPPED | OUTPATIENT
Start: 2024-10-23 | End: 2024-11-20

## 2024-10-23 NOTE — PROGRESS NOTES
Oh Baker  1960  [id number]      HISTORY OF PRESENT ILLNESS: Mr. Baker is a 64 y.o. male returns for a follow up visit for pain management  He has a diagnosis of   1. Localized osteoarthritis of hands, bilateral    2. Chronic pain syndrome    3. Encounter for therapeutic drug monitoring    .      New Medications since Last Office visit have been reviewed with patient.     As per Information Obtained from the PADT (Patient Assessment and Documentation Tool)    He complains of pain in the lower back, bilateral hands. He rates the pain 8/10 and describes it as aching, numbness. Current treatment regimen has helped relieve about 20% of the pain since beginning treatment plan.  He has constipation and tiredness stomach ache  any side effects from the current pain regimen. Patient reports that since implementation of their treatment plan; their physical functioning is unchanged, family/social relationships are unchanged, mood is unchanged sleep patterns are worse, and that the overall functioning is  somewhat better .  Patient denies/admits that any of the above have changed since last office visit. Patient denies misusing/abusing his narcotic pain medications or using any illegal drugs.      Upon obtaining medical history from Mr. Baker    ALLERGIES: Patients list of allergies were reviewed     MEDICATIONS: Mr. Baker list of medications were reviewed.His current medications are   Outpatient Medications Prior to Visit   Medication Sig Dispense Refill    HYDROcodone-acetaminophen (NORCO) 5-325 MG per tablet Take 1 tablet by mouth every 8 hours as needed for Pain for up to 21 days. Max Daily Amount: 3 tablets 63 tablet 0    atorvastatin (LIPITOR) 80 MG tablet Take 1 tablet by mouth nightly      amLODIPine (NORVASC) 5 MG tablet Take 2 tablets by mouth daily      nitroGLYCERIN (NITROSTAT) 0.4 MG SL tablet Place 1 tablet under the tongue every 5 minutes as needed for Chest pain up to max of 3 total doses.

## 2024-11-20 ENCOUNTER — OFFICE VISIT (OUTPATIENT)
Dept: PAIN MANAGEMENT | Age: 64
End: 2024-11-20
Payer: MEDICARE

## 2024-11-20 VITALS
DIASTOLIC BLOOD PRESSURE: 76 MMHG | WEIGHT: 192 LBS | SYSTOLIC BLOOD PRESSURE: 127 MMHG | OXYGEN SATURATION: 97 % | BODY MASS INDEX: 30.99 KG/M2 | HEART RATE: 54 BPM

## 2024-11-20 DIAGNOSIS — G89.4 CHRONIC PAIN SYNDROME: ICD-10-CM

## 2024-11-20 DIAGNOSIS — M19.042 LOCALIZED OSTEOARTHRITIS OF HANDS, BILATERAL: Primary | ICD-10-CM

## 2024-11-20 DIAGNOSIS — Z51.81 ENCOUNTER FOR THERAPEUTIC DRUG MONITORING: ICD-10-CM

## 2024-11-20 DIAGNOSIS — M19.041 LOCALIZED OSTEOARTHRITIS OF HANDS, BILATERAL: Primary | ICD-10-CM

## 2024-11-20 DIAGNOSIS — M79.2 NEUROPATHIC PAIN: ICD-10-CM

## 2024-11-20 PROCEDURE — 3078F DIAST BP <80 MM HG: CPT | Performed by: NURSE PRACTITIONER

## 2024-11-20 PROCEDURE — G8417 CALC BMI ABV UP PARAM F/U: HCPCS | Performed by: NURSE PRACTITIONER

## 2024-11-20 PROCEDURE — 3017F COLORECTAL CA SCREEN DOC REV: CPT | Performed by: NURSE PRACTITIONER

## 2024-11-20 PROCEDURE — G8427 DOCREV CUR MEDS BY ELIG CLIN: HCPCS | Performed by: NURSE PRACTITIONER

## 2024-11-20 PROCEDURE — G8484 FLU IMMUNIZE NO ADMIN: HCPCS | Performed by: NURSE PRACTITIONER

## 2024-11-20 PROCEDURE — 3074F SYST BP LT 130 MM HG: CPT | Performed by: NURSE PRACTITIONER

## 2024-11-20 PROCEDURE — 1036F TOBACCO NON-USER: CPT | Performed by: NURSE PRACTITIONER

## 2024-11-20 PROCEDURE — 99214 OFFICE O/P EST MOD 30 MIN: CPT | Performed by: NURSE PRACTITIONER

## 2024-11-20 RX ORDER — OXYCODONE AND ACETAMINOPHEN 5; 325 MG/1; MG/1
1 TABLET ORAL EVERY 8 HOURS PRN
Qty: 84 TABLET | Refills: 0 | Status: SHIPPED | OUTPATIENT
Start: 2024-11-20 | End: 2024-12-18

## 2024-11-20 RX ORDER — GABAPENTIN 300 MG/1
300 CAPSULE ORAL NIGHTLY PRN
Qty: 30 CAPSULE | Refills: 2 | Status: SHIPPED | OUTPATIENT
Start: 2024-11-20 | End: 2025-02-18

## 2024-11-20 NOTE — PROGRESS NOTES
Oh Baker  1960  [id number]      HISTORY OF PRESENT ILLNESS: Mr. Baker is a 64 y.o. male returns for a follow up visit for pain management  He has a diagnosis of   1. Localized osteoarthritis of hands, bilateral    2. Chronic pain syndrome    3. Encounter for therapeutic drug monitoring    4. Neuropathic pain    .      New Medications since Last Office visit have been reviewed with patient.     As per Information Obtained from the PADT (Patient Assessment and Documentation Tool)    He complains of pain in the neck, bilateral hands, lower back, right foot. He rates the pain 8/10 and describes it as aching, numbness. Current treatment regimen has helped relieve about 80% of the pain since beginning treatment plan.  He denies any side effects from the current pain regimen. Patient reports that since implementation of their treatment plan; their physical functioning is unchanged, family/social relationships are unchanged, mood is unchanged sleep patterns are unchanged, and that the overall functioning is unchanged.  Patient denies/admits that any of the above have changed since last office visit. Patient denies misusing/abusing his narcotic pain medications or using any illegal drugs.      Upon obtaining medical history from Mr. Baker    ALLERGIES: Patients list of allergies were reviewed     MEDICATIONS: Mr. Baker list of medications were reviewed.His current medications are   Outpatient Medications Prior to Visit   Medication Sig Dispense Refill    naloxone 4 MG/0.1ML LIQD nasal spray 1 spray by Nasal route as needed for Opioid Reversal 1 each 0    oxyCODONE-acetaminophen (PERCOCET) 5-325 MG per tablet Take 1 tablet by mouth 2 times daily as needed for Pain for up to 28 days. Max Daily Amount: 2 tablets 56 tablet 0    gabapentin (NEURONTIN) 300 MG capsule Take 1 capsule by mouth nightly as needed (neuropathic pain) for up to 30 days. 30 capsule 0    atorvastatin (LIPITOR) 80 MG tablet Take 1 tablet

## 2024-12-16 ENCOUNTER — TELEPHONE (OUTPATIENT)
Dept: PAIN MANAGEMENT | Age: 64
End: 2024-12-16

## 2024-12-16 DIAGNOSIS — M19.042 LOCALIZED OSTEOARTHRITIS OF HANDS, BILATERAL: ICD-10-CM

## 2024-12-16 DIAGNOSIS — M19.041 LOCALIZED OSTEOARTHRITIS OF HANDS, BILATERAL: ICD-10-CM

## 2024-12-16 RX ORDER — OXYCODONE AND ACETAMINOPHEN 5; 325 MG/1; MG/1
1 TABLET ORAL EVERY 8 HOURS PRN
Qty: 21 TABLET | Refills: 0 | Status: SHIPPED | OUTPATIENT
Start: 2024-12-16 | End: 2024-12-23

## 2024-12-23 ENCOUNTER — OFFICE VISIT (OUTPATIENT)
Dept: PAIN MANAGEMENT | Age: 64
End: 2024-12-23
Payer: MEDICARE

## 2024-12-23 VITALS
HEART RATE: 57 BPM | DIASTOLIC BLOOD PRESSURE: 71 MMHG | WEIGHT: 197 LBS | SYSTOLIC BLOOD PRESSURE: 134 MMHG | BODY MASS INDEX: 31.8 KG/M2 | OXYGEN SATURATION: 98 %

## 2024-12-23 DIAGNOSIS — M79.2 NEUROPATHIC PAIN: ICD-10-CM

## 2024-12-23 DIAGNOSIS — M19.041 LOCALIZED OSTEOARTHRITIS OF HANDS, BILATERAL: Primary | ICD-10-CM

## 2024-12-23 DIAGNOSIS — G89.4 CHRONIC PAIN SYNDROME: ICD-10-CM

## 2024-12-23 DIAGNOSIS — M19.042 LOCALIZED OSTEOARTHRITIS OF HANDS, BILATERAL: Primary | ICD-10-CM

## 2024-12-23 DIAGNOSIS — Z51.81 ENCOUNTER FOR THERAPEUTIC DRUG MONITORING: ICD-10-CM

## 2024-12-23 PROCEDURE — G8427 DOCREV CUR MEDS BY ELIG CLIN: HCPCS | Performed by: NURSE PRACTITIONER

## 2024-12-23 PROCEDURE — G8417 CALC BMI ABV UP PARAM F/U: HCPCS | Performed by: NURSE PRACTITIONER

## 2024-12-23 PROCEDURE — 99213 OFFICE O/P EST LOW 20 MIN: CPT | Performed by: NURSE PRACTITIONER

## 2024-12-23 PROCEDURE — 1036F TOBACCO NON-USER: CPT | Performed by: NURSE PRACTITIONER

## 2024-12-23 PROCEDURE — 3075F SYST BP GE 130 - 139MM HG: CPT | Performed by: NURSE PRACTITIONER

## 2024-12-23 PROCEDURE — 3078F DIAST BP <80 MM HG: CPT | Performed by: NURSE PRACTITIONER

## 2024-12-23 PROCEDURE — G8484 FLU IMMUNIZE NO ADMIN: HCPCS | Performed by: NURSE PRACTITIONER

## 2024-12-23 PROCEDURE — 3017F COLORECTAL CA SCREEN DOC REV: CPT | Performed by: NURSE PRACTITIONER

## 2024-12-23 RX ORDER — OXYCODONE AND ACETAMINOPHEN 5; 325 MG/1; MG/1
1 TABLET ORAL EVERY 8 HOURS PRN
Qty: 84 TABLET | Refills: 0 | Status: SHIPPED | OUTPATIENT
Start: 2024-12-23 | End: 2025-01-20

## 2024-12-23 NOTE — PROGRESS NOTES
inadvertently.  Thank you for allowing me to participate in the care of this patient.    MIGEL Marte    Cc: Oniel Mckeon MD

## 2025-01-20 ENCOUNTER — TELEPHONE (OUTPATIENT)
Dept: PAIN MANAGEMENT | Age: 65
End: 2025-01-20

## 2025-01-20 DIAGNOSIS — M19.042 LOCALIZED OSTEOARTHRITIS OF HANDS, BILATERAL: ICD-10-CM

## 2025-01-20 DIAGNOSIS — M19.041 LOCALIZED OSTEOARTHRITIS OF HANDS, BILATERAL: ICD-10-CM

## 2025-01-20 RX ORDER — OXYCODONE AND ACETAMINOPHEN 5; 325 MG/1; MG/1
1 TABLET ORAL EVERY 8 HOURS PRN
Qty: 21 TABLET | Refills: 0 | Status: SHIPPED | OUTPATIENT
Start: 2025-01-20 | End: 2025-01-27

## 2025-01-27 ENCOUNTER — OFFICE VISIT (OUTPATIENT)
Dept: PAIN MANAGEMENT | Age: 65
End: 2025-01-27
Payer: MEDICARE

## 2025-01-27 VITALS
HEART RATE: 58 BPM | SYSTOLIC BLOOD PRESSURE: 120 MMHG | OXYGEN SATURATION: 95 % | WEIGHT: 196 LBS | BODY MASS INDEX: 31.64 KG/M2 | DIASTOLIC BLOOD PRESSURE: 77 MMHG

## 2025-01-27 DIAGNOSIS — Z51.81 ENCOUNTER FOR THERAPEUTIC DRUG MONITORING: ICD-10-CM

## 2025-01-27 DIAGNOSIS — M79.2 NEUROPATHIC PAIN: ICD-10-CM

## 2025-01-27 DIAGNOSIS — M19.042 LOCALIZED OSTEOARTHRITIS OF HANDS, BILATERAL: Primary | ICD-10-CM

## 2025-01-27 DIAGNOSIS — G89.4 CHRONIC PAIN SYNDROME: ICD-10-CM

## 2025-01-27 DIAGNOSIS — M19.041 LOCALIZED OSTEOARTHRITIS OF HANDS, BILATERAL: Primary | ICD-10-CM

## 2025-01-27 PROCEDURE — G8417 CALC BMI ABV UP PARAM F/U: HCPCS | Performed by: NURSE PRACTITIONER

## 2025-01-27 PROCEDURE — 3017F COLORECTAL CA SCREEN DOC REV: CPT | Performed by: NURSE PRACTITIONER

## 2025-01-27 PROCEDURE — 3078F DIAST BP <80 MM HG: CPT | Performed by: NURSE PRACTITIONER

## 2025-01-27 PROCEDURE — 1036F TOBACCO NON-USER: CPT | Performed by: NURSE PRACTITIONER

## 2025-01-27 PROCEDURE — 99214 OFFICE O/P EST MOD 30 MIN: CPT | Performed by: NURSE PRACTITIONER

## 2025-01-27 PROCEDURE — G8427 DOCREV CUR MEDS BY ELIG CLIN: HCPCS | Performed by: NURSE PRACTITIONER

## 2025-01-27 PROCEDURE — 3074F SYST BP LT 130 MM HG: CPT | Performed by: NURSE PRACTITIONER

## 2025-01-27 RX ORDER — OXYCODONE AND ACETAMINOPHEN 5; 325 MG/1; MG/1
1 TABLET ORAL EVERY 8 HOURS PRN
Qty: 84 TABLET | Refills: 0 | Status: SHIPPED | OUTPATIENT
Start: 2025-01-27 | End: 2025-02-24

## 2025-01-27 RX ORDER — GABAPENTIN 300 MG/1
300 CAPSULE ORAL NIGHTLY PRN
Qty: 30 CAPSULE | Refills: 2 | Status: SHIPPED | OUTPATIENT
Start: 2025-01-27 | End: 2025-04-27

## 2025-02-18 ENCOUNTER — PATIENT MESSAGE (OUTPATIENT)
Dept: INTERNAL MEDICINE CLINIC | Age: 65
End: 2025-02-18

## 2025-02-24 ENCOUNTER — OFFICE VISIT (OUTPATIENT)
Dept: PAIN MANAGEMENT | Age: 65
End: 2025-02-24
Payer: MEDICARE

## 2025-02-24 VITALS
BODY MASS INDEX: 31.12 KG/M2 | WEIGHT: 192.8 LBS | DIASTOLIC BLOOD PRESSURE: 74 MMHG | HEART RATE: 54 BPM | OXYGEN SATURATION: 96 % | SYSTOLIC BLOOD PRESSURE: 114 MMHG

## 2025-02-24 DIAGNOSIS — Z51.81 ENCOUNTER FOR THERAPEUTIC DRUG MONITORING: ICD-10-CM

## 2025-02-24 DIAGNOSIS — M19.041 LOCALIZED OSTEOARTHRITIS OF HANDS, BILATERAL: Primary | ICD-10-CM

## 2025-02-24 DIAGNOSIS — M19.042 LOCALIZED OSTEOARTHRITIS OF HANDS, BILATERAL: Primary | ICD-10-CM

## 2025-02-24 DIAGNOSIS — M79.2 NEUROPATHIC PAIN: ICD-10-CM

## 2025-02-24 DIAGNOSIS — G89.4 CHRONIC PAIN SYNDROME: ICD-10-CM

## 2025-02-24 PROCEDURE — 3078F DIAST BP <80 MM HG: CPT | Performed by: NURSE PRACTITIONER

## 2025-02-24 PROCEDURE — G8427 DOCREV CUR MEDS BY ELIG CLIN: HCPCS | Performed by: NURSE PRACTITIONER

## 2025-02-24 PROCEDURE — G8417 CALC BMI ABV UP PARAM F/U: HCPCS | Performed by: NURSE PRACTITIONER

## 2025-02-24 PROCEDURE — 99214 OFFICE O/P EST MOD 30 MIN: CPT | Performed by: NURSE PRACTITIONER

## 2025-02-24 PROCEDURE — 3074F SYST BP LT 130 MM HG: CPT | Performed by: NURSE PRACTITIONER

## 2025-02-24 PROCEDURE — 3017F COLORECTAL CA SCREEN DOC REV: CPT | Performed by: NURSE PRACTITIONER

## 2025-02-24 PROCEDURE — 1036F TOBACCO NON-USER: CPT | Performed by: NURSE PRACTITIONER

## 2025-02-24 PROCEDURE — 1123F ACP DISCUSS/DSCN MKR DOCD: CPT | Performed by: NURSE PRACTITIONER

## 2025-02-24 RX ORDER — OXYCODONE AND ACETAMINOPHEN 5; 325 MG/1; MG/1
1 TABLET ORAL EVERY 8 HOURS PRN
Qty: 84 TABLET | Refills: 0 | Status: SHIPPED | OUTPATIENT
Start: 2025-02-25 | End: 2025-03-25

## 2025-02-24 NOTE — PROGRESS NOTES
tablet Take 1 tablet by mouth daily       No current facility-administered medications for this visit.     I will continue his current medication regimen  which is part of the above treatment schedule. It has been helping with Mr. Baker's chronic  medical problems which for this visit include:   The primary encounter diagnosis was Localized osteoarthritis of hands, bilateral. Diagnoses of Encounter for therapeutic drug monitoring, Chronic pain syndrome, and Neuropathic pain were also pertinent to this visit.   Risks and benefits of the medications and other alternative treatments  including no treatment were discussed with the patient.The common side effects of these medications were also explained to the patient.  Informed verbal consent was obtained.   Goals of current treatment regimen include improvement in pain, restoration of functioning- with focus on improvement in physical performance, general activity, work or disability,emotional distress, health care utilization and  decreased medication consumption. Will continue to monitor progress towards achieving/maintaining therapeutic goals with special emphasis on  1. Improvement in perceived interfernce  of pain with ADL's. Ability to do home exercises independently. Ability to do household chores indoor and/or outdoor work and social and leisure activities.Improve psychosocial and physical functioning. he is showing progression towards this treatment goal with the current regimen.     He was advised against drinking alcohol with the narcotic pain medicines, advised against driving or handling machinery while adjusting the dose of medicines or if having cognitive  issues related to the current medications.Risk of overdose and death, if medicines not taken as prescribed, were also discussed. If the patient develops new symptoms or if the symptoms worsen, the patient should call the office.    While transcribing every attempt was made to maintain the accuracy of

## 2025-03-18 ENCOUNTER — PATIENT MESSAGE (OUTPATIENT)
Dept: INTERNAL MEDICINE CLINIC | Age: 65
End: 2025-03-18

## 2025-03-18 DIAGNOSIS — Z12.12 SCREENING FOR COLORECTAL CANCER: Primary | ICD-10-CM

## 2025-03-18 DIAGNOSIS — Z12.11 SCREENING FOR COLORECTAL CANCER: Primary | ICD-10-CM

## 2025-03-24 ENCOUNTER — OFFICE VISIT (OUTPATIENT)
Dept: PAIN MANAGEMENT | Age: 65
End: 2025-03-24
Payer: MEDICARE

## 2025-03-24 VITALS
SYSTOLIC BLOOD PRESSURE: 140 MMHG | WEIGHT: 194 LBS | OXYGEN SATURATION: 97 % | DIASTOLIC BLOOD PRESSURE: 82 MMHG | BODY MASS INDEX: 31.31 KG/M2 | HEART RATE: 57 BPM

## 2025-03-24 DIAGNOSIS — M19.041 LOCALIZED OSTEOARTHRITIS OF HANDS, BILATERAL: ICD-10-CM

## 2025-03-24 DIAGNOSIS — M79.2 NEUROPATHIC PAIN: Primary | ICD-10-CM

## 2025-03-24 DIAGNOSIS — M19.042 LOCALIZED OSTEOARTHRITIS OF HANDS, BILATERAL: ICD-10-CM

## 2025-03-24 DIAGNOSIS — Z51.81 ENCOUNTER FOR THERAPEUTIC DRUG MONITORING: ICD-10-CM

## 2025-03-24 DIAGNOSIS — G89.4 CHRONIC PAIN SYNDROME: ICD-10-CM

## 2025-03-24 PROCEDURE — 3079F DIAST BP 80-89 MM HG: CPT | Performed by: NURSE PRACTITIONER

## 2025-03-24 PROCEDURE — G8417 CALC BMI ABV UP PARAM F/U: HCPCS | Performed by: NURSE PRACTITIONER

## 2025-03-24 PROCEDURE — G8427 DOCREV CUR MEDS BY ELIG CLIN: HCPCS | Performed by: NURSE PRACTITIONER

## 2025-03-24 PROCEDURE — 99214 OFFICE O/P EST MOD 30 MIN: CPT | Performed by: NURSE PRACTITIONER

## 2025-03-24 PROCEDURE — 1123F ACP DISCUSS/DSCN MKR DOCD: CPT | Performed by: NURSE PRACTITIONER

## 2025-03-24 PROCEDURE — 3077F SYST BP >= 140 MM HG: CPT | Performed by: NURSE PRACTITIONER

## 2025-03-24 PROCEDURE — 1036F TOBACCO NON-USER: CPT | Performed by: NURSE PRACTITIONER

## 2025-03-24 PROCEDURE — 3017F COLORECTAL CA SCREEN DOC REV: CPT | Performed by: NURSE PRACTITIONER

## 2025-03-24 RX ORDER — OXYCODONE AND ACETAMINOPHEN 5; 325 MG/1; MG/1
1 TABLET ORAL EVERY 8 HOURS PRN
Qty: 84 TABLET | Refills: 0 | Status: SHIPPED | OUTPATIENT
Start: 2025-03-24 | End: 2025-04-21

## 2025-03-24 NOTE — PROGRESS NOTES
Oh Baker  1960  [id number]      HISTORY OF PRESENT ILLNESS: Mr. aBker is a 65 y.o. male returns for a follow up visit for pain management  He has a diagnosis of   1. Neuropathic pain    2. Localized osteoarthritis of hands, bilateral    3. Encounter for therapeutic drug monitoring    4. Chronic pain syndrome    .      New Medications since Last Office visit have been reviewed with patient.     As per Information Obtained from the PADT (Patient Assessment and Documentation Tool)    He complains of pain in the neck, bilateral hands, right foot. He rates the pain 8/10 and describes it as aching. Current treatment regimen has helped relieve about 80% of the pain since beginning treatment plan.  He denies any side effects from the current pain regimen. Patient reports that since implementation of their treatment plan; their physical functioning is unchanged, family/social relationships are unchanged, mood is unchanged sleep patterns are unchanged, and that the overall functioning is better.  Patient denies/admits that any of the above have changed since last office visit. Patient denies misusing/abusing his narcotic pain medications or using any illegal drugs.      Upon obtaining medical history from Mr. Baker    ALLERGIES: Patients list of allergies were reviewed     MEDICATIONS: Mr. Baker list of medications were reviewed.His current medications are   Outpatient Medications Prior to Visit   Medication Sig Dispense Refill    oxyCODONE-acetaminophen (PERCOCET) 5-325 MG per tablet Take 1 tablet by mouth every 8 hours as needed for Pain for up to 28 days. Max Daily Amount: 3 tablets 84 tablet 0    naloxone 4 MG/0.1ML LIQD nasal spray 1 spray by Nasal route as needed for Opioid Reversal 1 each 0    atorvastatin (LIPITOR) 80 MG tablet Take 1 tablet by mouth nightly      amLODIPine (NORVASC) 5 MG tablet Take 2 tablets by mouth daily      nitroGLYCERIN (NITROSTAT) 0.4 MG SL tablet Place 1 tablet under the

## 2025-03-28 LAB — NONINV COLON CA DNA+OCC BLD SCRN STL QL: POSITIVE

## 2025-04-01 ENCOUNTER — RESULTS FOLLOW-UP (OUTPATIENT)
Dept: INTERNAL MEDICINE CLINIC | Age: 65
End: 2025-04-01

## 2025-04-01 DIAGNOSIS — R19.5 POSITIVE COLORECTAL CANCER SCREENING USING COLOGUARD TEST: Primary | ICD-10-CM

## 2025-04-09 ENCOUNTER — OFFICE VISIT (OUTPATIENT)
Dept: INTERNAL MEDICINE CLINIC | Age: 65
End: 2025-04-09
Payer: MEDICARE

## 2025-04-09 VITALS
WEIGHT: 189 LBS | OXYGEN SATURATION: 98 % | HEART RATE: 80 BPM | DIASTOLIC BLOOD PRESSURE: 80 MMHG | BODY MASS INDEX: 30.51 KG/M2 | SYSTOLIC BLOOD PRESSURE: 132 MMHG

## 2025-04-09 DIAGNOSIS — I10 PRIMARY HYPERTENSION: ICD-10-CM

## 2025-04-09 DIAGNOSIS — R19.5 POSITIVE COLORECTAL CANCER SCREENING USING COLOGUARD TEST: ICD-10-CM

## 2025-04-09 DIAGNOSIS — J06.9 URTI (ACUTE UPPER RESPIRATORY INFECTION): Primary | ICD-10-CM

## 2025-04-09 PROCEDURE — 99214 OFFICE O/P EST MOD 30 MIN: CPT | Performed by: INTERNAL MEDICINE

## 2025-04-09 PROCEDURE — G8427 DOCREV CUR MEDS BY ELIG CLIN: HCPCS | Performed by: INTERNAL MEDICINE

## 2025-04-09 PROCEDURE — 3017F COLORECTAL CA SCREEN DOC REV: CPT | Performed by: INTERNAL MEDICINE

## 2025-04-09 PROCEDURE — 1123F ACP DISCUSS/DSCN MKR DOCD: CPT | Performed by: INTERNAL MEDICINE

## 2025-04-09 PROCEDURE — 3075F SYST BP GE 130 - 139MM HG: CPT | Performed by: INTERNAL MEDICINE

## 2025-04-09 PROCEDURE — 3079F DIAST BP 80-89 MM HG: CPT | Performed by: INTERNAL MEDICINE

## 2025-04-09 PROCEDURE — 1036F TOBACCO NON-USER: CPT | Performed by: INTERNAL MEDICINE

## 2025-04-09 PROCEDURE — G8417 CALC BMI ABV UP PARAM F/U: HCPCS | Performed by: INTERNAL MEDICINE

## 2025-04-09 RX ORDER — FLUTICASONE PROPIONATE 50 MCG
1 SPRAY, SUSPENSION (ML) NASAL DAILY
Qty: 16 G | Refills: 0 | Status: SHIPPED | OUTPATIENT
Start: 2025-04-09

## 2025-04-09 RX ORDER — AZITHROMYCIN 250 MG/1
TABLET, FILM COATED ORAL
Qty: 6 TABLET | Refills: 0 | Status: SHIPPED | OUTPATIENT
Start: 2025-04-09 | End: 2025-04-19

## 2025-04-09 SDOH — ECONOMIC STABILITY: FOOD INSECURITY: WITHIN THE PAST 12 MONTHS, THE FOOD YOU BOUGHT JUST DIDN'T LAST AND YOU DIDN'T HAVE MONEY TO GET MORE.: NEVER TRUE

## 2025-04-09 SDOH — ECONOMIC STABILITY: FOOD INSECURITY: WITHIN THE PAST 12 MONTHS, YOU WORRIED THAT YOUR FOOD WOULD RUN OUT BEFORE YOU GOT MONEY TO BUY MORE.: NEVER TRUE

## 2025-04-09 ASSESSMENT — ENCOUNTER SYMPTOMS
CONSTIPATION: 0
SORE THROAT: 1
WHEEZING: 1
ABDOMINAL PAIN: 0
SHORTNESS OF BREATH: 0
COLOR CHANGE: 0
NAUSEA: 0
BACK PAIN: 0
CHEST TIGHTNESS: 0
COUGH: 1
VOMITING: 0

## 2025-04-09 ASSESSMENT — PATIENT HEALTH QUESTIONNAIRE - PHQ9
SUM OF ALL RESPONSES TO PHQ QUESTIONS 1-9: 0
1. LITTLE INTEREST OR PLEASURE IN DOING THINGS: NOT AT ALL
2. FEELING DOWN, DEPRESSED OR HOPELESS: NOT AT ALL

## 2025-04-09 NOTE — PROGRESS NOTES
tenderness.   Musculoskeletal:         General: No swelling. Normal range of motion.      Cervical back: Neck supple.   Skin:     General: Skin is warm and dry.   Neurological:      General: No focal deficit present.      Mental Status: He is alert. Mental status is at baseline.   Psychiatric:         Mood and Affect: Mood normal.           Electronically signed by Oniel Foster MD on 4/9/2025 at 2:26 PM.    This dictation was generated by voice recognition computer software.  Although all attempts are made to edit the dictation for accuracy, there may be errors in the transcription that are not intended.

## 2025-04-09 NOTE — ASSESSMENT & PLAN NOTE
Patient is scheduled to have colonoscopy on the 21st, he will follow-up here in the office following completing the procedure, hopefully Cologuard is falsely positive , no other red flag symptoms

## 2025-04-09 NOTE — ASSESSMENT & PLAN NOTE
Supportive care measures discussed with patient, will cover empirically with Z-Karel due to presence of fever, advised can also use Flonase and daily antihistamine, ensure adequate hydration, call the office if no improvement in symptoms, he will follow-up in 2 weeks as he is due for a wellness visit and annual update for blood work

## 2025-04-09 NOTE — ASSESSMENT & PLAN NOTE
Blood pressure stable on current medication regimen, continue same, he will return in 2 weeks for updated annual checkup and labs.  Cautioned about using over-the-counter cough and cold medications with decongestants and advised to use Coricidin HBP for his nasal congestion and avoid Sudafed and similar medications

## 2025-04-17 ENCOUNTER — TELEPHONE (OUTPATIENT)
Dept: PAIN MANAGEMENT | Age: 65
End: 2025-04-17

## 2025-04-17 DIAGNOSIS — M19.041 LOCALIZED OSTEOARTHRITIS OF HANDS, BILATERAL: ICD-10-CM

## 2025-04-17 DIAGNOSIS — M19.042 LOCALIZED OSTEOARTHRITIS OF HANDS, BILATERAL: ICD-10-CM

## 2025-04-17 RX ORDER — OXYCODONE AND ACETAMINOPHEN 5; 325 MG/1; MG/1
1 TABLET ORAL EVERY 8 HOURS PRN
Qty: 21 TABLET | Refills: 0 | Status: SHIPPED | OUTPATIENT
Start: 2025-04-17 | End: 2025-04-24

## 2025-04-18 ENCOUNTER — ANESTHESIA EVENT (OUTPATIENT)
Dept: ENDOSCOPY | Age: 65
End: 2025-04-18
Payer: MEDICARE

## 2025-04-19 NOTE — OP NOTE
Colonoscopy Procedure Note      Patient: Oh Baker  : 1960  Acct#:     Procedure: Colonoscopy with polypectomy (cold snare)    Date:  2025    Surgeon:  Sam Cleaning MD    Referring Physician:  Oniel Foster MD    Previous Colonoscopy: YES  Date: unknown  Greater than 3 years: YES    Preoperative Diagnosis:  1. Positive Cologuard     Postoperative Diagnosis:  1. Transverse Colon Polyp 2. Internal hemorrhoids    Consent:  The patient or their legal guardian has signed a consent, and is aware of the potential risks, benefits, alternatives, and potential complications of this procedure.  These include, but are not limited to hemorrhage, bleeding, post procedural pain, perforation, phlebitis, aspiration, hypotension, hypoxia, cardiovascular events such as arryhthmia, and possibly death.  Additionally, the possibility of missed colonic polyps and interval colon cancer was discussed in the consent.    Anesthesia:  The patient was administered IV propofol per anesthesiology team.  Please see their operative records for full details.      Procedure:   An informed consent was obtained from the patient after explanation of indications, benefits, possible risks and complications of the procedure.  The patient was then taken to the endoscopy suite, placed in the left lateral decubitus position, and the above IV anesthesia was administered.    A digital rectal examination was performed and revealed negative without mass, lesions or tenderness.      The Olympus video colonoscope was placed in the patient's rectum under digital direction and advanced to the cecum. The cecum was identified by characteristic anatomy and ballottment.  The preparation was good.  The ileocecal valve was identified.     The scope was then withdrawn back through the cecum, ascending, transverse, descending, sigmoid colon, and rectum.  Careful circumferential examination of the mucosa in these areas demonstrated:    A

## 2025-04-19 NOTE — H&P
Pre-operative History and Physical    Patient: Oh Baker  : 1960  Acct#:     Intended Procedure:  Colonoscopy    HISTORY OF PRESENT ILLNESS:  The patient is a 65 y.o. male  who presents for/due to Positive Cologuard      Past Medical History:        Diagnosis Date    Arthritis     BPH (benign prostatic hyperplasia)     CAD (coronary artery disease)     Chronic back pain     GERD (gastroesophageal reflux disease)     Hyperlipidemia     Hypertension     Wears glasses      Past Surgical History:        Procedure Laterality Date    COLONOSCOPY      CORONARY ANGIOPLASTY WITH STENT PLACEMENT      total 15 stents    CORONARY ARTERY BYPASS GRAFT      x6    HERNIA REPAIR      abdominal area     Medications Prior to Admission:   Prior to Admission medications    Medication Sig Start Date End Date Taking? Authorizing Provider   oxyCODONE-acetaminophen (PERCOCET) 5-325 MG per tablet Take 1 tablet by mouth every 8 hours as needed for Pain for up to 7 days. Max Daily Amount: 3 tablets 25 Yes Anitha Dan APRN - CNP   fluticasone (FLONASE) 50 MCG/ACT nasal spray 1 spray by Each Nostril route daily 25  Yes Oniel Foster MD   naloxone 4 MG/0.1ML LIQD nasal spray 1 spray by Nasal route as needed for Opioid Reversal 10/23/24  Yes Anitha Dan APRN - CNP   atorvastatin (LIPITOR) 80 MG tablet Take 1 tablet by mouth nightly 24  Yes Shyann Calderon MD   amLODIPine (NORVASC) 5 MG tablet Take 2 tablets by mouth daily   Yes Shyann Calderon MD   nitroGLYCERIN (NITROSTAT) 0.4 MG SL tablet Place 1 tablet under the tongue every 5 minutes as needed for Chest pain up to max of 3 total doses. If no relief after 1 dose, call 911.   Yes Shyann Calderon MD   pantoprazole (PROTONIX) 20 MG tablet Take 1 tablet by mouth daily   Yes Shyann Calderon MD   metoprolol succinate (TOPROL XL) 50 MG extended release tablet Take 1 tablet by mouth daily   Yes Shyann Calderon MD

## 2025-04-21 ENCOUNTER — ANESTHESIA (OUTPATIENT)
Dept: ENDOSCOPY | Age: 65
End: 2025-04-21
Payer: MEDICARE

## 2025-04-21 ENCOUNTER — HOSPITAL ENCOUNTER (OUTPATIENT)
Age: 65
Setting detail: OUTPATIENT SURGERY
Discharge: HOME | End: 2025-04-21
Attending: INTERNAL MEDICINE | Admitting: INTERNAL MEDICINE
Payer: MEDICARE

## 2025-04-21 ENCOUNTER — APPOINTMENT (OUTPATIENT)
Dept: ENDOSCOPY | Age: 65
End: 2025-04-21
Attending: INTERNAL MEDICINE
Payer: MEDICARE

## 2025-04-21 VITALS
SYSTOLIC BLOOD PRESSURE: 116 MMHG | TEMPERATURE: 97.4 F | DIASTOLIC BLOOD PRESSURE: 69 MMHG | HEIGHT: 66 IN | RESPIRATION RATE: 15 BRPM | WEIGHT: 179.9 LBS | HEART RATE: 55 BPM | BODY MASS INDEX: 28.91 KG/M2 | OXYGEN SATURATION: 99 %

## 2025-04-21 DIAGNOSIS — Z12.11 SCREEN FOR COLON CANCER: ICD-10-CM

## 2025-04-21 PROCEDURE — 7100000001 HC PACU RECOVERY - ADDTL 15 MIN: Performed by: INTERNAL MEDICINE

## 2025-04-21 PROCEDURE — 2709999900 HC NON-CHARGEABLE SUPPLY: Performed by: INTERNAL MEDICINE

## 2025-04-21 PROCEDURE — 7100000000 HC PACU RECOVERY - FIRST 15 MIN: Performed by: INTERNAL MEDICINE

## 2025-04-21 PROCEDURE — 7100000010 HC PHASE II RECOVERY - FIRST 15 MIN: Performed by: INTERNAL MEDICINE

## 2025-04-21 PROCEDURE — 2580000003 HC RX 258: Performed by: NURSE ANESTHETIST, CERTIFIED REGISTERED

## 2025-04-21 PROCEDURE — 88305 TISSUE EXAM BY PATHOLOGIST: CPT

## 2025-04-21 PROCEDURE — 3609010600 HC COLONOSCOPY POLYPECTOMY SNARE/COLD BIOPSY: Performed by: INTERNAL MEDICINE

## 2025-04-21 PROCEDURE — 7100000011 HC PHASE II RECOVERY - ADDTL 15 MIN: Performed by: INTERNAL MEDICINE

## 2025-04-21 PROCEDURE — 3700000000 HC ANESTHESIA ATTENDED CARE: Performed by: INTERNAL MEDICINE

## 2025-04-21 PROCEDURE — 3700000001 HC ADD 15 MINUTES (ANESTHESIA): Performed by: INTERNAL MEDICINE

## 2025-04-21 PROCEDURE — 6360000002 HC RX W HCPCS: Performed by: NURSE ANESTHETIST, CERTIFIED REGISTERED

## 2025-04-21 RX ORDER — DIPHENHYDRAMINE HYDROCHLORIDE 50 MG/ML
12.5 INJECTION, SOLUTION INTRAMUSCULAR; INTRAVENOUS
Status: DISCONTINUED | OUTPATIENT
Start: 2025-04-21 | End: 2025-04-21 | Stop reason: HOSPADM

## 2025-04-21 RX ORDER — SODIUM CHLORIDE 0.9 % (FLUSH) 0.9 %
5-40 SYRINGE (ML) INJECTION EVERY 12 HOURS SCHEDULED
Status: DISCONTINUED | OUTPATIENT
Start: 2025-04-21 | End: 2025-04-21 | Stop reason: HOSPADM

## 2025-04-21 RX ORDER — SODIUM CHLORIDE 9 MG/ML
INJECTION, SOLUTION INTRAVENOUS
Status: DISCONTINUED | OUTPATIENT
Start: 2025-04-21 | End: 2025-04-21 | Stop reason: SDUPTHER

## 2025-04-21 RX ORDER — SODIUM CHLORIDE 0.9 % (FLUSH) 0.9 %
5-40 SYRINGE (ML) INJECTION PRN
Status: DISCONTINUED | OUTPATIENT
Start: 2025-04-21 | End: 2025-04-21 | Stop reason: HOSPADM

## 2025-04-21 RX ORDER — NALOXONE HYDROCHLORIDE 0.4 MG/ML
INJECTION, SOLUTION INTRAMUSCULAR; INTRAVENOUS; SUBCUTANEOUS PRN
Status: DISCONTINUED | OUTPATIENT
Start: 2025-04-21 | End: 2025-04-21 | Stop reason: HOSPADM

## 2025-04-21 RX ORDER — SODIUM CHLORIDE 9 MG/ML
INJECTION, SOLUTION INTRAVENOUS PRN
Status: DISCONTINUED | OUTPATIENT
Start: 2025-04-21 | End: 2025-04-21 | Stop reason: HOSPADM

## 2025-04-21 RX ORDER — PROPOFOL 10 MG/ML
INJECTION, EMULSION INTRAVENOUS
Status: DISCONTINUED | OUTPATIENT
Start: 2025-04-21 | End: 2025-04-21 | Stop reason: SDUPTHER

## 2025-04-21 RX ORDER — ONDANSETRON 2 MG/ML
4 INJECTION INTRAMUSCULAR; INTRAVENOUS
Status: DISCONTINUED | OUTPATIENT
Start: 2025-04-21 | End: 2025-04-21 | Stop reason: HOSPADM

## 2025-04-21 RX ORDER — LIDOCAINE HYDROCHLORIDE 20 MG/ML
INJECTION, SOLUTION EPIDURAL; INFILTRATION; INTRACAUDAL; PERINEURAL
Status: DISCONTINUED | OUTPATIENT
Start: 2025-04-21 | End: 2025-04-21 | Stop reason: SDUPTHER

## 2025-04-21 RX ADMIN — PROPOFOL 90 MG: 10 INJECTION, EMULSION INTRAVENOUS at 11:10

## 2025-04-21 RX ADMIN — LIDOCAINE HYDROCHLORIDE 100 MG: 20 INJECTION, SOLUTION EPIDURAL; INFILTRATION; INTRACAUDAL; PERINEURAL at 11:10

## 2025-04-21 RX ADMIN — SODIUM CHLORIDE: 9 INJECTION, SOLUTION INTRAVENOUS at 11:03

## 2025-04-21 RX ADMIN — PROPOFOL 150 MCG/KG/MIN: 10 INJECTION, EMULSION INTRAVENOUS at 11:11

## 2025-04-21 ASSESSMENT — LIFESTYLE VARIABLES: SMOKING_STATUS: 0

## 2025-04-21 ASSESSMENT — PAIN - FUNCTIONAL ASSESSMENT: PAIN_FUNCTIONAL_ASSESSMENT: 0-10

## 2025-04-21 ASSESSMENT — ENCOUNTER SYMPTOMS: SHORTNESS OF BREATH: 0

## 2025-04-21 ASSESSMENT — PAIN SCALES - GENERAL
PAINLEVEL_OUTOF10: 0

## 2025-04-21 NOTE — DISCHARGE INSTRUCTIONS
Recommendations:  Await pathology results   Recommend repeat colonoscopy in 5 years for surveillance purposes  Results will be posted to the [facility] patient portal in 7-10 days. If you dont have access call the office phone at [phone] for results.      Discharge Instructions for Colonoscopy     Colonoscopy is a visual exam of the lining of the large intestine, also called the bowel or colon, with a colonoscope. A colonoscope is a flexible tube with a light and a viewing device. It allows the doctor to view the inside of the colon through a tiny video camera.     Colonoscopy is performed for many reasons: unexplained anemia , pain, diarrhea , bloody stools, cancer screening, among many other reasons.     Complications from a colonoscopy are rare. Some possible serious complications include perforated bowel (which might require surgery) and bleeding (which could require blood transfusion ). Minor complications include bloating, gas, and cramping that can last for 1-2 days after the procedure.     Because air is put into your colon during the procedure, it is normal to pass large amounts of air from your rectum. You may not have a bowel movement for 1-3 days after the procedure.     What You Will Need:  Someone to drive you home after the procedure     Steps to Take:  Home Care -  Rest when you get home.   Because the sedative will make you drowsy, don't drive, operate machinery, or make important decisions the day of the procedure.  Feelings of bloating, gas, or cramping may persist for 24 hours.   Diet -  Try sips of water first. If tolerated, resume bland food (scrambled eggs, toast, soup) first.  If tolerated, resume regular diet or the diet recommended by your physician.   Do not drink alcohol for 24 hours.   Physical Activity -  Ask your doctor when you will be able to return to work.   Do not drive, operate heavy machinery, or do activities that require coordination or balance for 24 hours.

## 2025-04-21 NOTE — FLOWSHEET NOTE
Pt to PACU from Endo, Pt is resting comfortably with stable vital signs on room air.      04/21/25 1134   Vital Signs   Temp 98.8 °F (37.1 °C)   Pulse 64   Respirations 10   BP (!) 104/59   MAP (Calculated) 74   Pain Assessment   Pain Assessment Adult Nonverbal Pain Scale (NPVS)   Pain Level 0   Adult Nonverbal Pain Scale (NVPS)   Face 0   Activity (Movement) 0   Guarding 0   Physiology (Vital Signs) 0   Respiratory 0   NVPS Score  0   Oxygen Therapy   SpO2 94 %   O2 Device None (Room air)   Rhythm Interpretation   Cardiac Rhythm Sinus rhythm

## 2025-04-21 NOTE — ANESTHESIA POSTPROCEDURE EVALUATION
Department of Anesthesiology  Postprocedure Note    Patient: Oh Baker  MRN: [id number]  YOB: 1960  Date of evaluation: 4/21/2025    Procedure Summary       Date: 04/21/25 Room / Location: [place] [address] / [facility]    Anesthesia Start: 1103 Anesthesia Stop: 1128    Procedure: COLONOSCOPY POLYPECTOMY SNARE/BIOPSY Diagnosis:       Screen for colon cancer      (Screen for colon cancer [Z12.11])    Surgeons: Sam Cleaning MD Responsible Provider: Wilmer Fontenot MD    Anesthesia Type: MAC ASA Status: 3            Anesthesia Type: MAC    Armida Phase I: Armida Score: 9    Armida Phase II: Armida Score: 10    Anesthesia Post Evaluation    Patient location during evaluation: PACU  Patient participation: complete - patient participated  Level of consciousness: awake and alert  Pain score: 0  Nausea & Vomiting: no nausea  Cardiovascular status: hemodynamically stable  Respiratory status: acceptable  Hydration status: stable  Pain management: adequate    No notable events documented.

## 2025-04-21 NOTE — PROGRESS NOTES
Pt arrived from PACU alert and oriented, vitals stable on room air, denies pain. PO snacks given. Message sent to wife.  
Pt is alert and oriented and denies pain at this time, vital signs stable on room air. Will transfer to phase 2   
Sedation provided by anesthesia. see anesthesia record for vitals and medication administration  
Up to chair with no issues. IV removed. Dressed with help from wife.   
your surgery.    C-Difficile admission screening and protocol:       * Admitted with diarrhea?                         [] YES    [x]  NO     *Prior history of C-Diff. In last 3 months? [] YES    [x]  NO     *Antibiotic use in the past 6-8 weeks?      [x]  NO    []  YES                 If yes, which ANTIBIOTIC AND REASON______     *Prior hospitalization or nursing home in the last month? []  YES    [x]  NO        SAFETY FIRST...call before you fall!!!

## 2025-04-21 NOTE — ANESTHESIA PRE PROCEDURE
\"[id number]\"     Type & Screen (If Applicable):  No results found for: \"ABORH\", \"LABANTI\"    Drug/Infectious Status (If Applicable):  No results found for: \"HIV\", \"HEPCAB\"    COVID-19 Screening (If Applicable): No results found for: \"COVID19\"        Anesthesia Evaluation  Patient summary reviewed   no history of anesthetic complications:   Airway: Mallampati: II  TM distance: >3 FB   Neck ROM: full  Mouth opening: > = 3 FB   Dental: normal exam         Pulmonary:Negative Pulmonary ROS       (-) shortness of breath and not a current smoker          Patient did not smoke on day of surgery.                 Cardiovascular:Negative CV ROS  Exercise tolerance: Can walk up 2 flights of stairs with some dyspnea  (+) CAD:, CABG/stent (6v CABG in 2002, \"15 stents\" since then last being in 2020.):, hyperlipidemia    (-) pacemaker, past MI and  angina       Beta Blocker:  Dose within 24 Hrs         Neuro/Psych:   Negative Neuro/Psych ROS     (-) seizures and CVA           GI/Hepatic/Renal:   (+) GERD:, bowel prep     (-) liver disease and no renal disease       Endo/Other: Negative Endo/Other ROS       (-) diabetes mellitus, hypothyroidism, hyperthyroidism               Abdominal:             Vascular: negative vascular ROS.         Other Findings:             Anesthesia Plan      MAC     ASA 3       Induction: intravenous.      Anesthetic plan and risks discussed with patient and spouse.      Plan discussed with CRNA.                    Wilmer Fontenot MD   4/21/2025      This pre-anesthesia assessment may be used as a history and physical.    DOS STAFF ADDENDUM:    Pt seen and examined, chart reviewed (including anesthesia, drug and allergy history).  No interval changes to history and physical examination.  Anesthetic plan, risks, benefits, alternatives, and personnel involved discussed with patient.  Patient verbalized an understanding and agrees to proceed.      Wilmer Fontenot MD  April 21, 2025  10:22 AM

## 2025-04-28 ENCOUNTER — OFFICE VISIT (OUTPATIENT)
Dept: PAIN MANAGEMENT | Age: 65
End: 2025-04-28
Payer: MEDICARE

## 2025-04-28 VITALS
OXYGEN SATURATION: 98 % | WEIGHT: 186 LBS | SYSTOLIC BLOOD PRESSURE: 119 MMHG | DIASTOLIC BLOOD PRESSURE: 74 MMHG | BODY MASS INDEX: 30.02 KG/M2 | HEART RATE: 63 BPM

## 2025-04-28 DIAGNOSIS — M19.042 LOCALIZED OSTEOARTHRITIS OF HANDS, BILATERAL: ICD-10-CM

## 2025-04-28 DIAGNOSIS — G89.4 CHRONIC PAIN SYNDROME: ICD-10-CM

## 2025-04-28 DIAGNOSIS — M79.2 NEUROPATHIC PAIN: Primary | ICD-10-CM

## 2025-04-28 DIAGNOSIS — M19.041 LOCALIZED OSTEOARTHRITIS OF HANDS, BILATERAL: ICD-10-CM

## 2025-04-28 DIAGNOSIS — Z51.81 ENCOUNTER FOR THERAPEUTIC DRUG MONITORING: ICD-10-CM

## 2025-04-28 PROCEDURE — 3078F DIAST BP <80 MM HG: CPT | Performed by: NURSE PRACTITIONER

## 2025-04-28 PROCEDURE — 1036F TOBACCO NON-USER: CPT | Performed by: NURSE PRACTITIONER

## 2025-04-28 PROCEDURE — 3017F COLORECTAL CA SCREEN DOC REV: CPT | Performed by: NURSE PRACTITIONER

## 2025-04-28 PROCEDURE — G8417 CALC BMI ABV UP PARAM F/U: HCPCS | Performed by: NURSE PRACTITIONER

## 2025-04-28 PROCEDURE — 3074F SYST BP LT 130 MM HG: CPT | Performed by: NURSE PRACTITIONER

## 2025-04-28 PROCEDURE — G8427 DOCREV CUR MEDS BY ELIG CLIN: HCPCS | Performed by: NURSE PRACTITIONER

## 2025-04-28 PROCEDURE — 1123F ACP DISCUSS/DSCN MKR DOCD: CPT | Performed by: NURSE PRACTITIONER

## 2025-04-28 PROCEDURE — 99213 OFFICE O/P EST LOW 20 MIN: CPT | Performed by: NURSE PRACTITIONER

## 2025-04-28 RX ORDER — OXYCODONE AND ACETAMINOPHEN 5; 325 MG/1; MG/1
1 TABLET ORAL EVERY 8 HOURS PRN
Qty: 84 TABLET | Refills: 0 | Status: SHIPPED | OUTPATIENT
Start: 2025-04-28 | End: 2025-05-26

## 2025-04-28 NOTE — PROGRESS NOTES
Oh Baker  1960  [id number]      HISTORY OF PRESENT ILLNESS: Mr. Baker is a 65 y.o. male returns for a follow up visit for pain management  He has a diagnosis of   1. Neuropathic pain    2. Localized osteoarthritis of hands, bilateral    3. Encounter for therapeutic drug monitoring    4. Chronic pain syndrome    .      New Medications since Last Office visit have been reviewed with patient.     As per Information Obtained from the PADT (Patient Assessment and Documentation Tool)    He complains of pain in the neck, bilateral hands, lower back. He rates the pain 8/10 and describes it as aching. Current treatment regimen has helped relieve about 80% of the pain since beginning treatment plan.  He denies any side effects from the current pain regimen. Patient reports that since implementation of their treatment plan; their physical functioning is better, family/social relationships are unchanged, mood is unchanged sleep patterns are unchanged, and that the overall functioning is better.  Patient denies/admits that any of the above have changed since last office visit. Patient denies misusing/abusing his narcotic pain medications or using any illegal drugs.      Upon obtaining medical history from Mr. Baker    ALLERGIES: Patients list of allergies were reviewed     MEDICATIONS: Mr. Baker list of medications were reviewed.His current medications are   Outpatient Medications Prior to Visit   Medication Sig Dispense Refill    fluticasone (FLONASE) 50 MCG/ACT nasal spray 1 spray by Each Nostril route daily 16 g 0    naloxone 4 MG/0.1ML LIQD nasal spray 1 spray by Nasal route as needed for Opioid Reversal 1 each 0    atorvastatin (LIPITOR) 80 MG tablet Take 1 tablet by mouth nightly      amLODIPine (NORVASC) 5 MG tablet Take 2 tablets by mouth daily      nitroGLYCERIN (NITROSTAT) 0.4 MG SL tablet Place 1 tablet under the tongue every 5 minutes as needed for Chest pain up to max of 3 total doses. If no

## 2025-05-15 NOTE — PROGRESS NOTES
medications for this visit.     I will continue his current medication regimen  which is part of the above treatment schedule. It has been helping with Mr. Baker's chronic  medical problems which for this visit include:   The primary encounter diagnosis was Localized osteoarthritis of hands, bilateral. Diagnoses of Encounter for therapeutic drug monitoring, Chronic pain syndrome, and Neuropathic pain were also pertinent to this visit.   Risks and benefits of the medications and other alternative treatments  including no treatment were discussed with the patient.The common side effects of these medications were also explained to the patient.  Informed verbal consent was obtained.   Goals of current treatment regimen include improvement in pain, restoration of functioning- with focus on improvement in physical performance, general activity, work or disability,emotional distress, health care utilization and  decreased medication consumption. Will continue to monitor progress towards achieving/maintaining therapeutic goals with special emphasis on  1. Improvement in perceived interfernce  of pain with ADL's. Ability to do home exercises independently. Ability to do household chores indoor and/or outdoor work and social and leisure activities.Improve psychosocial and physical functioning. he is showing progression towards this treatment goal with the current regimen.     He was advised against drinking alcohol with the narcotic pain medicines, advised against driving or handling machinery while adjusting the dose of medicines or if having cognitive  issues related to the current medications.Risk of overdose and death, if medicines not taken as prescribed, were also discussed. If the patient develops new symptoms or if the symptoms worsen, the patient should call the office.    While transcribing every attempt was made to maintain the accuracy of the note in terms of it's contents,there may have been some errors made  done

## 2025-05-19 SDOH — HEALTH STABILITY: PHYSICAL HEALTH: ON AVERAGE, HOW MANY MINUTES DO YOU ENGAGE IN EXERCISE AT THIS LEVEL?: 40 MIN

## 2025-05-19 SDOH — HEALTH STABILITY: PHYSICAL HEALTH: ON AVERAGE, HOW MANY DAYS PER WEEK DO YOU ENGAGE IN MODERATE TO STRENUOUS EXERCISE (LIKE A BRISK WALK)?: 4 DAYS

## 2025-05-22 ENCOUNTER — OFFICE VISIT (OUTPATIENT)
Dept: FAMILY MEDICINE CLINIC | Age: 65
End: 2025-05-22
Payer: MEDICARE

## 2025-05-22 VITALS
BODY MASS INDEX: 30.44 KG/M2 | DIASTOLIC BLOOD PRESSURE: 74 MMHG | WEIGHT: 189.4 LBS | HEIGHT: 66 IN | OXYGEN SATURATION: 98 % | SYSTOLIC BLOOD PRESSURE: 110 MMHG | TEMPERATURE: 97.9 F | HEART RATE: 63 BPM

## 2025-05-22 DIAGNOSIS — Z13.1 DIABETES MELLITUS SCREENING: ICD-10-CM

## 2025-05-22 DIAGNOSIS — I10 PRIMARY HYPERTENSION: ICD-10-CM

## 2025-05-22 DIAGNOSIS — M79.2 NEUROPATHIC PAIN: ICD-10-CM

## 2025-05-22 DIAGNOSIS — I25.118 CORONARY ARTERY DISEASE OF NATIVE ARTERY OF NATIVE HEART WITH STABLE ANGINA PECTORIS: ICD-10-CM

## 2025-05-22 DIAGNOSIS — E78.5 HYPERLIPIDEMIA, UNSPECIFIED HYPERLIPIDEMIA TYPE: ICD-10-CM

## 2025-05-22 DIAGNOSIS — R13.10 DYSPHAGIA, UNSPECIFIED TYPE: ICD-10-CM

## 2025-05-22 DIAGNOSIS — G89.4 CHRONIC PAIN SYNDROME: ICD-10-CM

## 2025-05-22 DIAGNOSIS — Z76.89 ENCOUNTER TO ESTABLISH CARE: Primary | ICD-10-CM

## 2025-05-22 PROCEDURE — 1036F TOBACCO NON-USER: CPT | Performed by: NURSE PRACTITIONER

## 2025-05-22 PROCEDURE — G8427 DOCREV CUR MEDS BY ELIG CLIN: HCPCS | Performed by: NURSE PRACTITIONER

## 2025-05-22 PROCEDURE — 1123F ACP DISCUSS/DSCN MKR DOCD: CPT | Performed by: NURSE PRACTITIONER

## 2025-05-22 PROCEDURE — G8417 CALC BMI ABV UP PARAM F/U: HCPCS | Performed by: NURSE PRACTITIONER

## 2025-05-22 PROCEDURE — 3078F DIAST BP <80 MM HG: CPT | Performed by: NURSE PRACTITIONER

## 2025-05-22 PROCEDURE — 3074F SYST BP LT 130 MM HG: CPT | Performed by: NURSE PRACTITIONER

## 2025-05-22 PROCEDURE — 3017F COLORECTAL CA SCREEN DOC REV: CPT | Performed by: NURSE PRACTITIONER

## 2025-05-22 PROCEDURE — 99204 OFFICE O/P NEW MOD 45 MIN: CPT | Performed by: NURSE PRACTITIONER

## 2025-05-22 RX ORDER — GABAPENTIN 300 MG/1
300 CAPSULE ORAL 2 TIMES DAILY
Qty: 60 CAPSULE | Refills: 1 | Status: SHIPPED | OUTPATIENT
Start: 2025-05-22 | End: 2025-07-21

## 2025-05-22 ASSESSMENT — ENCOUNTER SYMPTOMS
COUGH: 0
SHORTNESS OF BREATH: 0
ABDOMINAL PAIN: 0

## 2025-05-22 NOTE — PROGRESS NOTES
Independent in ADLs.     Current Outpatient Medications   Medication Sig Dispense Refill    gabapentin (NEURONTIN) 300 MG capsule Take 1 capsule by mouth 2 times daily for 60 days. Intended supply: 30 days 60 capsule 1    oxyCODONE-acetaminophen (PERCOCET) 5-325 MG per tablet Take 1 tablet by mouth every 8 hours as needed for Pain for up to 28 days. Max Daily Amount: 3 tablets 84 tablet 0    atorvastatin (LIPITOR) 80 MG tablet Take 1 tablet by mouth nightly      amLODIPine (NORVASC) 5 MG tablet Take 2 tablets by mouth daily      nitroGLYCERIN (NITROSTAT) 0.4 MG SL tablet Place 1 tablet under the tongue every 5 minutes as needed for Chest pain up to max of 3 total doses. If no relief after 1 dose, call 911.      pantoprazole (PROTONIX) 20 MG tablet Take 1 tablet by mouth daily      metoprolol succinate (TOPROL XL) 50 MG extended release tablet Take 1 tablet by mouth daily      aspirin EC 81 MG EC tablet Take 1 tablet by mouth daily       No current facility-administered medications for this visit.        Past Medical History:   Diagnosis Date    Arthritis     BPH (benign prostatic hyperplasia)     CAD (coronary artery disease)     Chronic back pain     GERD (gastroesophageal reflux disease)     Hyperlipidemia     Hypertension     Wears glasses         Review of Systems   Constitutional:  Negative for fever and unexpected weight change.   Respiratory:  Negative for cough and shortness of breath.    Cardiovascular:  Negative for chest pain, palpitations and leg swelling.   Gastrointestinal:  Negative for abdominal pain.        Difficulty swallowing at times   Genitourinary: Negative.    Musculoskeletal:  Positive for arthralgias and myalgias.   Skin: Negative.           Objective   Physical Exam  Constitutional:       General: He is not in acute distress.  HENT:      Head: Normocephalic and atraumatic.   Eyes:      Extraocular Movements: Extraocular movements intact.      Conjunctiva/sclera: Conjunctivae normal.

## 2025-05-26 ENCOUNTER — TELEPHONE (OUTPATIENT)
Dept: PAIN MANAGEMENT | Age: 65
End: 2025-05-26

## 2025-05-26 DIAGNOSIS — M19.041 LOCALIZED OSTEOARTHRITIS OF HANDS, BILATERAL: ICD-10-CM

## 2025-05-26 DIAGNOSIS — M19.042 LOCALIZED OSTEOARTHRITIS OF HANDS, BILATERAL: ICD-10-CM

## 2025-05-26 RX ORDER — OXYCODONE AND ACETAMINOPHEN 5; 325 MG/1; MG/1
1 TABLET ORAL EVERY 8 HOURS PRN
Qty: 21 TABLET | Refills: 0 | Status: SHIPPED | OUTPATIENT
Start: 2025-05-26 | End: 2025-06-02

## 2025-06-02 ENCOUNTER — OFFICE VISIT (OUTPATIENT)
Dept: PAIN MANAGEMENT | Age: 65
End: 2025-06-02
Payer: MEDICARE

## 2025-06-02 VITALS
SYSTOLIC BLOOD PRESSURE: 149 MMHG | OXYGEN SATURATION: 98 % | WEIGHT: 190 LBS | BODY MASS INDEX: 30.68 KG/M2 | DIASTOLIC BLOOD PRESSURE: 77 MMHG | HEART RATE: 55 BPM

## 2025-06-02 DIAGNOSIS — M19.042 LOCALIZED OSTEOARTHRITIS OF HANDS, BILATERAL: ICD-10-CM

## 2025-06-02 DIAGNOSIS — Z51.81 ENCOUNTER FOR THERAPEUTIC DRUG MONITORING: ICD-10-CM

## 2025-06-02 DIAGNOSIS — G89.4 CHRONIC PAIN SYNDROME: ICD-10-CM

## 2025-06-02 DIAGNOSIS — M79.2 NEUROPATHIC PAIN: Primary | ICD-10-CM

## 2025-06-02 DIAGNOSIS — M19.041 LOCALIZED OSTEOARTHRITIS OF HANDS, BILATERAL: ICD-10-CM

## 2025-06-02 PROCEDURE — G8427 DOCREV CUR MEDS BY ELIG CLIN: HCPCS | Performed by: NURSE PRACTITIONER

## 2025-06-02 PROCEDURE — 3078F DIAST BP <80 MM HG: CPT | Performed by: NURSE PRACTITIONER

## 2025-06-02 PROCEDURE — 99214 OFFICE O/P EST MOD 30 MIN: CPT | Performed by: NURSE PRACTITIONER

## 2025-06-02 PROCEDURE — G8417 CALC BMI ABV UP PARAM F/U: HCPCS | Performed by: NURSE PRACTITIONER

## 2025-06-02 PROCEDURE — 3077F SYST BP >= 140 MM HG: CPT | Performed by: NURSE PRACTITIONER

## 2025-06-02 PROCEDURE — 3017F COLORECTAL CA SCREEN DOC REV: CPT | Performed by: NURSE PRACTITIONER

## 2025-06-02 PROCEDURE — 1036F TOBACCO NON-USER: CPT | Performed by: NURSE PRACTITIONER

## 2025-06-02 PROCEDURE — 1123F ACP DISCUSS/DSCN MKR DOCD: CPT | Performed by: NURSE PRACTITIONER

## 2025-06-02 RX ORDER — GABAPENTIN 300 MG/1
300 CAPSULE ORAL 2 TIMES DAILY
Qty: 60 CAPSULE | Refills: 1 | Status: SHIPPED | OUTPATIENT
Start: 2025-06-02 | End: 2025-08-01

## 2025-06-02 RX ORDER — OXYCODONE AND ACETAMINOPHEN 5; 325 MG/1; MG/1
1 TABLET ORAL EVERY 8 HOURS PRN
Qty: 84 TABLET | Refills: 0 | Status: SHIPPED | OUTPATIENT
Start: 2025-06-02 | End: 2025-06-30

## 2025-06-02 NOTE — PROGRESS NOTES
hO Baker  1960  [id number]      HISTORY OF PRESENT ILLNESS: Mr. Baker is a 65 y.o. male returns for a follow up visit for pain management  He has a diagnosis of   1. Neuropathic pain    2. Localized osteoarthritis of hands, bilateral    3. Encounter for therapeutic drug monitoring    4. Chronic pain syndrome    .      New Medications since Last Office visit have been reviewed with patient.     As per Information Obtained from the PADT (Patient Assessment and Documentation Tool)    He complains of pain in the neck, bilateral hands, lower back, right foot. He rates the pain 8/10 and describes it as aching. Current treatment regimen has helped relieve about 80% of the pain since beginning treatment plan.  He denies any side effects from the current pain regimen. Patient reports that since implementation of their treatment plan; their physical functioning is unchanged, family/social relationships are unchanged, mood is unchanged sleep patterns are unchanged, and that the overall functioning is better.  Patient denies/admits that any of the above have changed since last office visit. Patient denies misusing/abusing his narcotic pain medications or using any illegal drugs.      Upon obtaining medical history from Mr. Baker    ALLERGIES: Patients list of allergies were reviewed     MEDICATIONS: Mr. Baker list of medications were reviewed.His current medications are   Outpatient Medications Prior to Visit   Medication Sig Dispense Refill    oxyCODONE-acetaminophen (PERCOCET) 5-325 MG per tablet Take 1 tablet by mouth every 8 hours as needed for Pain for up to 7 days. Max Daily Amount: 3 tablets 21 tablet 0    gabapentin (NEURONTIN) 300 MG capsule Take 1 capsule by mouth 2 times daily for 60 days. Intended supply: 30 days 60 capsule 1    atorvastatin (LIPITOR) 80 MG tablet Take 1 tablet by mouth nightly      amLODIPine (NORVASC) 5 MG tablet Take 2 tablets by mouth daily      nitroGLYCERIN (NITROSTAT) 0.4

## 2025-06-10 DIAGNOSIS — I25.118 CORONARY ARTERY DISEASE OF NATIVE ARTERY OF NATIVE HEART WITH STABLE ANGINA PECTORIS: ICD-10-CM

## 2025-06-10 DIAGNOSIS — Z13.1 DIABETES MELLITUS SCREENING: ICD-10-CM

## 2025-06-10 LAB
CHOLEST SERPL-MCNC: 81 MG/DL (ref 0–199)
HDLC SERPL-MCNC: 15 MG/DL (ref 40–60)
LDL CHOLESTEROL: 27 MG/DL
TRIGL SERPL-MCNC: 194 MG/DL (ref 0–150)
VLDLC SERPL CALC-MCNC: 39 MG/DL

## 2025-06-11 LAB
EST. AVERAGE GLUCOSE BLD GHB EST-MCNC: 116.9 MG/DL
HBA1C MFR BLD: 5.7 %

## 2025-06-12 ENCOUNTER — RESULTS FOLLOW-UP (OUTPATIENT)
Dept: FAMILY MEDICINE CLINIC | Age: 65
End: 2025-06-12

## 2025-06-30 ENCOUNTER — OFFICE VISIT (OUTPATIENT)
Dept: PAIN MANAGEMENT | Age: 65
End: 2025-06-30
Payer: MEDICARE

## 2025-06-30 VITALS
DIASTOLIC BLOOD PRESSURE: 85 MMHG | HEART RATE: 59 BPM | OXYGEN SATURATION: 98 % | WEIGHT: 186 LBS | BODY MASS INDEX: 30.04 KG/M2 | SYSTOLIC BLOOD PRESSURE: 137 MMHG

## 2025-06-30 DIAGNOSIS — Z51.81 ENCOUNTER FOR THERAPEUTIC DRUG MONITORING: ICD-10-CM

## 2025-06-30 DIAGNOSIS — M19.042 LOCALIZED OSTEOARTHRITIS OF HANDS, BILATERAL: ICD-10-CM

## 2025-06-30 DIAGNOSIS — M79.2 NEUROPATHIC PAIN: Primary | ICD-10-CM

## 2025-06-30 DIAGNOSIS — G89.4 CHRONIC PAIN SYNDROME: ICD-10-CM

## 2025-06-30 DIAGNOSIS — M19.041 LOCALIZED OSTEOARTHRITIS OF HANDS, BILATERAL: ICD-10-CM

## 2025-06-30 PROCEDURE — G8417 CALC BMI ABV UP PARAM F/U: HCPCS | Performed by: NURSE PRACTITIONER

## 2025-06-30 PROCEDURE — G8427 DOCREV CUR MEDS BY ELIG CLIN: HCPCS | Performed by: NURSE PRACTITIONER

## 2025-06-30 PROCEDURE — 3075F SYST BP GE 130 - 139MM HG: CPT | Performed by: NURSE PRACTITIONER

## 2025-06-30 PROCEDURE — 3017F COLORECTAL CA SCREEN DOC REV: CPT | Performed by: NURSE PRACTITIONER

## 2025-06-30 PROCEDURE — 1036F TOBACCO NON-USER: CPT | Performed by: NURSE PRACTITIONER

## 2025-06-30 PROCEDURE — 99213 OFFICE O/P EST LOW 20 MIN: CPT | Performed by: NURSE PRACTITIONER

## 2025-06-30 PROCEDURE — 3079F DIAST BP 80-89 MM HG: CPT | Performed by: NURSE PRACTITIONER

## 2025-06-30 PROCEDURE — 1123F ACP DISCUSS/DSCN MKR DOCD: CPT | Performed by: NURSE PRACTITIONER

## 2025-06-30 RX ORDER — OXYCODONE AND ACETAMINOPHEN 5; 325 MG/1; MG/1
1 TABLET ORAL EVERY 8 HOURS PRN
Qty: 84 TABLET | Refills: 0 | Status: SHIPPED | OUTPATIENT
Start: 2025-06-30 | End: 2025-07-28

## 2025-06-30 NOTE — PROGRESS NOTES
Oh Baker  1960  [id number]      HISTORY OF PRESENT ILLNESS: Mr. Baker is a 65 y.o. male returns for a follow up visit for pain management  He has a diagnosis of   1. Neuropathic pain    2. Localized osteoarthritis of hands, bilateral    3. Encounter for therapeutic drug monitoring    4. Chronic pain syndrome    .      New Medications since Last Office visit have been reviewed with patient.     As per Information Obtained from the PADT (Patient Assessment and Documentation Tool)    He complains of pain in the neck, bilateral hands, lower back, right foot. He rates the pain 8/10 and describes it as aching. Current treatment regimen has helped relieve about 80% of the pain since beginning treatment plan.  He denies any side effects from the current pain regimen. Patient reports that since implementation of their treatment plan; their physical functioning is better, family/social relationships are unchanged, mood is unchanged sleep patterns are unchanged, and that the overall functioning is better.  Patient denies/admits that any of the above have changed since last office visit. Patient denies misusing/abusing his narcotic pain medications or using any illegal drugs.      Upon obtaining medical history from Mr. Baker    ALLERGIES: Patients list of allergies were reviewed     MEDICATIONS: Mr. Baker list of medications were reviewed.His current medications are   Outpatient Medications Prior to Visit   Medication Sig Dispense Refill    gabapentin (NEURONTIN) 300 MG capsule Take 1 capsule by mouth 2 times daily for 60 days. Intended supply: 30 days 60 capsule 1    oxyCODONE-acetaminophen (PERCOCET) 5-325 MG per tablet Take 1 tablet by mouth every 8 hours as needed for Pain for up to 28 days. Max Daily Amount: 3 tablets 84 tablet 0    atorvastatin (LIPITOR) 80 MG tablet Take 1 tablet by mouth nightly      amLODIPine (NORVASC) 5 MG tablet Take 2 tablets by mouth daily      nitroGLYCERIN (NITROSTAT) 0.4

## 2025-07-01 ENCOUNTER — TELEPHONE (OUTPATIENT)
Dept: FAMILY MEDICINE CLINIC | Age: 65
End: 2025-07-01

## 2025-07-11 SDOH — HEALTH STABILITY: PHYSICAL HEALTH: ON AVERAGE, HOW MANY MINUTES DO YOU ENGAGE IN EXERCISE AT THIS LEVEL?: 40 MIN

## 2025-07-11 SDOH — HEALTH STABILITY: PHYSICAL HEALTH: ON AVERAGE, HOW MANY DAYS PER WEEK DO YOU ENGAGE IN MODERATE TO STRENUOUS EXERCISE (LIKE A BRISK WALK)?: 3 DAYS

## 2025-07-11 ASSESSMENT — PATIENT HEALTH QUESTIONNAIRE - PHQ9
SUM OF ALL RESPONSES TO PHQ QUESTIONS 1-9: 0
SUM OF ALL RESPONSES TO PHQ QUESTIONS 1-9: 0
1. LITTLE INTEREST OR PLEASURE IN DOING THINGS: NOT AT ALL
SUM OF ALL RESPONSES TO PHQ QUESTIONS 1-9: 0
2. FEELING DOWN, DEPRESSED OR HOPELESS: NOT AT ALL
SUM OF ALL RESPONSES TO PHQ QUESTIONS 1-9: 0

## 2025-07-11 ASSESSMENT — LIFESTYLE VARIABLES
HOW MANY STANDARD DRINKS CONTAINING ALCOHOL DO YOU HAVE ON A TYPICAL DAY: 2
HAS A RELATIVE, FRIEND, DOCTOR, OR ANOTHER HEALTH PROFESSIONAL EXPRESSED CONCERN ABOUT YOUR DRINKING OR SUGGESTED YOU CUT DOWN: NO
HOW MANY STANDARD DRINKS CONTAINING ALCOHOL DO YOU HAVE ON A TYPICAL DAY: 3 OR 4
HOW OFTEN DURING THE LAST YEAR HAVE YOU HAD A FEELING OF GUILT OR REMORSE AFTER DRINKING: NEVER
HOW OFTEN DURING THE LAST YEAR HAVE YOU NEEDED AN ALCOHOLIC DRINK FIRST THING IN THE MORNING TO GET YOURSELF GOING AFTER A NIGHT OF HEAVY DRINKING: NEVER
HAS A RELATIVE, FRIEND, DOCTOR, OR ANOTHER HEALTH PROFESSIONAL EXPRESSED CONCERN ABOUT YOUR DRINKING OR SUGGESTED YOU CUT DOWN: NO
HOW OFTEN DO YOU HAVE A DRINK CONTAINING ALCOHOL: MONTHLY OR LESS
HAVE YOU OR SOMEONE ELSE BEEN INJURED AS A RESULT OF YOUR DRINKING: NO
HOW OFTEN DURING THE LAST YEAR HAVE YOU NEEDED AN ALCOHOLIC DRINK FIRST THING IN THE MORNING TO GET YOURSELF GOING AFTER A NIGHT OF HEAVY DRINKING: NEVER
HOW OFTEN DURING THE LAST YEAR HAVE YOU BEEN UNABLE TO REMEMBER WHAT HAPPENED THE NIGHT BEFORE BECAUSE YOU HAD BEEN DRINKING: NEVER
HOW OFTEN DURING THE LAST YEAR HAVE YOU FOUND THAT YOU WERE NOT ABLE TO STOP DRINKING ONCE YOU HAD STARTED: NEVER
HOW OFTEN DO YOU HAVE A DRINK CONTAINING ALCOHOL: 2
HOW OFTEN DURING THE LAST YEAR HAVE YOU HAD A FEELING OF GUILT OR REMORSE AFTER DRINKING: NEVER
HOW OFTEN DURING THE LAST YEAR HAVE YOU FAILED TO DO WHAT WAS NORMALLY EXPECTED FROM YOU BECAUSE OF DRINKING: NEVER
HOW OFTEN DO YOU HAVE SIX OR MORE DRINKS ON ONE OCCASION: 1
HAVE YOU OR SOMEONE ELSE BEEN INJURED AS A RESULT OF YOUR DRINKING: NO
HOW OFTEN DURING THE LAST YEAR HAVE YOU FOUND THAT YOU WERE NOT ABLE TO STOP DRINKING ONCE YOU HAD STARTED: NEVER
HOW OFTEN DURING THE LAST YEAR HAVE YOU BEEN UNABLE TO REMEMBER WHAT HAPPENED THE NIGHT BEFORE BECAUSE YOU HAD BEEN DRINKING: NEVER
HOW OFTEN DURING THE LAST YEAR HAVE YOU FAILED TO DO WHAT WAS NORMALLY EXPECTED FROM YOU BECAUSE OF DRINKING: NEVER

## 2025-07-14 ENCOUNTER — OFFICE VISIT (OUTPATIENT)
Dept: FAMILY MEDICINE CLINIC | Age: 65
End: 2025-07-14
Payer: MEDICARE

## 2025-07-14 VITALS
SYSTOLIC BLOOD PRESSURE: 116 MMHG | TEMPERATURE: 97.8 F | DIASTOLIC BLOOD PRESSURE: 74 MMHG | OXYGEN SATURATION: 97 % | BODY MASS INDEX: 29.99 KG/M2 | HEART RATE: 67 BPM | HEIGHT: 66 IN | WEIGHT: 186.6 LBS

## 2025-07-14 DIAGNOSIS — Z00.00 MEDICARE ANNUAL WELLNESS VISIT, SUBSEQUENT: Primary | ICD-10-CM

## 2025-07-14 PROCEDURE — G0439 PPPS, SUBSEQ VISIT: HCPCS | Performed by: NURSE PRACTITIONER

## 2025-07-14 PROCEDURE — 3078F DIAST BP <80 MM HG: CPT | Performed by: NURSE PRACTITIONER

## 2025-07-14 PROCEDURE — 3017F COLORECTAL CA SCREEN DOC REV: CPT | Performed by: NURSE PRACTITIONER

## 2025-07-14 PROCEDURE — 3074F SYST BP LT 130 MM HG: CPT | Performed by: NURSE PRACTITIONER

## 2025-07-14 PROCEDURE — 1123F ACP DISCUSS/DSCN MKR DOCD: CPT | Performed by: NURSE PRACTITIONER

## 2025-07-14 NOTE — PROGRESS NOTES
Immunization History   Administered Date(s) Administered    COVID-19, MODERNA BLUE border, Primary or Immunocompromised, (age 12y+), IM, 100 mcg/0.5mL 03/18/2021, 04/15/2021, 12/06/2021    TDaP, ADACEL (age 10y-64y), BOOSTRIX (age 10y+), IM, 0.5mL 04/15/2017    Td, unspecified formulation 03/30/2014       
  Temp: 97.8 °F (36.6 °C)   TempSrc: Temporal   SpO2: 97%   Weight: 84.6 kg (186 lb 9.6 oz)   Height: 1.676 m (5' 5.98\")      Body mass index is 30.13 kg/m².                No Known Allergies  Prior to Visit Medications    Medication Sig Taking? Authorizing Provider   oxyCODONE-acetaminophen (PERCOCET) 5-325 MG per tablet Take 1 tablet by mouth every 8 hours as needed for Pain for up to 28 days. Max Daily Amount: 3 tablets Yes Anitha Dan APRN - CNP   gabapentin (NEURONTIN) 300 MG capsule Take 1 capsule by mouth 2 times daily for 60 days. Intended supply: 30 days Yes Anitha Dan APRN - CNP   atorvastatin (LIPITOR) 80 MG tablet Take 1 tablet by mouth nightly Yes Shyann Calderon MD   amLODIPine (NORVASC) 5 MG tablet Take 2 tablets by mouth daily Yes ProviderShyann MD   nitroGLYCERIN (NITROSTAT) 0.4 MG SL tablet Place 1 tablet under the tongue every 5 minutes as needed for Chest pain up to max of 3 total doses. If no relief after 1 dose, call 911. Yes ProviderShyann MD   pantoprazole (PROTONIX) 20 MG tablet Take 1 tablet by mouth daily Yes ProviderShyann MD   metoprolol succinate (TOPROL XL) 50 MG extended release tablet Take 1 tablet by mouth daily Yes ProviderShyann MD   aspirin EC 81 MG EC tablet Take 1 tablet by mouth daily Yes Provider, Historical, MD       CareTeam (Including outside providers/suppliers regularly involved in providing care):   Patient Care Team:  Odalys Philippe APRN - NP as PCP - General (Internal Medicine)  Odalys Philippe APRN - NP as PCP - Empaneled Provider   Pain-Anitha Dan NP every 3 months, mainly arthritis in hands.  Cardiac- at [facility] every year manages meds. Hx of cardiac stents x 10.    Recommendations for Preventive Services Due: see orders and patient instructions/AVS.  Recommended screening schedule for the next 5-10 years is provided to the patient in written form: see Patient Instructions/AVS.     Reviewed and updated this

## 2025-07-18 DIAGNOSIS — M79.2 NEUROPATHIC PAIN: ICD-10-CM

## 2025-07-18 RX ORDER — GABAPENTIN 300 MG/1
300 CAPSULE ORAL 2 TIMES DAILY
Qty: 180 CAPSULE | Refills: 1 | Status: SHIPPED | OUTPATIENT
Start: 2025-07-18 | End: 2025-10-16

## 2025-07-28 ENCOUNTER — TELEPHONE (OUTPATIENT)
Dept: PAIN MANAGEMENT | Age: 65
End: 2025-07-28

## 2025-07-28 DIAGNOSIS — M19.041 LOCALIZED OSTEOARTHRITIS OF HANDS, BILATERAL: ICD-10-CM

## 2025-07-28 DIAGNOSIS — M19.042 LOCALIZED OSTEOARTHRITIS OF HANDS, BILATERAL: ICD-10-CM

## 2025-07-28 RX ORDER — OXYCODONE AND ACETAMINOPHEN 5; 325 MG/1; MG/1
1 TABLET ORAL EVERY 8 HOURS PRN
Qty: 21 TABLET | Refills: 0 | Status: SHIPPED | OUTPATIENT
Start: 2025-07-28 | End: 2025-08-04

## 2025-08-04 ENCOUNTER — OFFICE VISIT (OUTPATIENT)
Dept: PAIN MANAGEMENT | Age: 65
End: 2025-08-04
Payer: MEDICARE

## 2025-08-04 VITALS
OXYGEN SATURATION: 97 % | WEIGHT: 186 LBS | BODY MASS INDEX: 30.04 KG/M2 | HEART RATE: 63 BPM | DIASTOLIC BLOOD PRESSURE: 77 MMHG | SYSTOLIC BLOOD PRESSURE: 114 MMHG

## 2025-08-04 DIAGNOSIS — G89.4 CHRONIC PAIN SYNDROME: ICD-10-CM

## 2025-08-04 DIAGNOSIS — Z51.81 ENCOUNTER FOR THERAPEUTIC DRUG MONITORING: ICD-10-CM

## 2025-08-04 DIAGNOSIS — M19.041 LOCALIZED OSTEOARTHRITIS OF HANDS, BILATERAL: Primary | ICD-10-CM

## 2025-08-04 DIAGNOSIS — M19.042 LOCALIZED OSTEOARTHRITIS OF HANDS, BILATERAL: Primary | ICD-10-CM

## 2025-08-04 DIAGNOSIS — M79.2 NEUROPATHIC PAIN: ICD-10-CM

## 2025-08-04 PROCEDURE — 99213 OFFICE O/P EST LOW 20 MIN: CPT | Performed by: NURSE PRACTITIONER

## 2025-08-04 PROCEDURE — G8417 CALC BMI ABV UP PARAM F/U: HCPCS | Performed by: NURSE PRACTITIONER

## 2025-08-04 PROCEDURE — 3017F COLORECTAL CA SCREEN DOC REV: CPT | Performed by: NURSE PRACTITIONER

## 2025-08-04 PROCEDURE — G8427 DOCREV CUR MEDS BY ELIG CLIN: HCPCS | Performed by: NURSE PRACTITIONER

## 2025-08-04 PROCEDURE — 3074F SYST BP LT 130 MM HG: CPT | Performed by: NURSE PRACTITIONER

## 2025-08-04 PROCEDURE — 3078F DIAST BP <80 MM HG: CPT | Performed by: NURSE PRACTITIONER

## 2025-08-04 PROCEDURE — 1123F ACP DISCUSS/DSCN MKR DOCD: CPT | Performed by: NURSE PRACTITIONER

## 2025-08-04 PROCEDURE — 1036F TOBACCO NON-USER: CPT | Performed by: NURSE PRACTITIONER

## 2025-08-04 RX ORDER — OXYCODONE AND ACETAMINOPHEN 5; 325 MG/1; MG/1
1 TABLET ORAL EVERY 8 HOURS PRN
Qty: 84 TABLET | Refills: 0 | Status: SHIPPED | OUTPATIENT
Start: 2025-08-04 | End: 2025-09-01

## 2025-09-01 ENCOUNTER — TELEPHONE (OUTPATIENT)
Dept: PAIN MANAGEMENT | Age: 65
End: 2025-09-01

## 2025-09-01 DIAGNOSIS — M19.041 LOCALIZED OSTEOARTHRITIS OF HANDS, BILATERAL: ICD-10-CM

## 2025-09-01 DIAGNOSIS — M19.042 LOCALIZED OSTEOARTHRITIS OF HANDS, BILATERAL: ICD-10-CM

## 2025-09-01 RX ORDER — OXYCODONE AND ACETAMINOPHEN 5; 325 MG/1; MG/1
1 TABLET ORAL EVERY 8 HOURS PRN
Qty: 21 TABLET | Refills: 0 | Status: SHIPPED | OUTPATIENT
Start: 2025-09-01 | End: 2025-09-08

## (undated) DEVICE — TRAP POLYP ETRAP

## (undated) DEVICE — FORMALIN CLEAR VIAL 20 ML 10%

## (undated) DEVICE — ENDOSCOPY KIT: Brand: MEDLINE INDUSTRIES, INC.

## (undated) DEVICE — SNARES COLD OVAL 10MM THIN